# Patient Record
Sex: FEMALE | Race: WHITE | NOT HISPANIC OR LATINO | Employment: FULL TIME | URBAN - METROPOLITAN AREA
[De-identification: names, ages, dates, MRNs, and addresses within clinical notes are randomized per-mention and may not be internally consistent; named-entity substitution may affect disease eponyms.]

---

## 2017-01-21 DIAGNOSIS — K21.9 GERD (GASTROESOPHAGEAL REFLUX DISEASE): Primary | ICD-10-CM

## 2017-09-07 ENCOUNTER — OFFICE VISIT (OUTPATIENT)
Dept: FAMILY MEDICINE | Facility: CLINIC | Age: 47
End: 2017-09-07
Payer: COMMERCIAL

## 2017-09-07 VITALS
DIASTOLIC BLOOD PRESSURE: 94 MMHG | HEART RATE: 90 BPM | HEIGHT: 65 IN | OXYGEN SATURATION: 99 % | TEMPERATURE: 98 F | WEIGHT: 285.2 LBS | BODY MASS INDEX: 47.52 KG/M2 | SYSTOLIC BLOOD PRESSURE: 144 MMHG

## 2017-09-07 DIAGNOSIS — K21.9 GASTROESOPHAGEAL REFLUX DISEASE, ESOPHAGITIS PRESENCE NOT SPECIFIED: ICD-10-CM
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
## 2017-09-07 DIAGNOSIS — S13.9XXA SPRAIN OF NECK, INITIAL ENCOUNTER: Primary | ICD-10-CM

## 2017-09-07 PROCEDURE — 99214 OFFICE O/P EST MOD 30 MIN: CPT | Performed by: INTERNAL MEDICINE

## 2017-09-07 RX ORDER — OXYCODONE AND ACETAMINOPHEN 5; 325 MG/1; MG/1
1-2 TABLET ORAL
Qty: 30 TABLET | Refills: 0 | Status: SHIPPED | OUTPATIENT
Start: 2017-09-07 | End: 2019-04-19

## 2017-09-07 RX ORDER — NAPROXEN 500 MG/1
500 TABLET ORAL 2 TIMES DAILY WITH MEALS
Qty: 60 TABLET | Refills: 1 | Status: SHIPPED | OUTPATIENT
Start: 2017-09-07 | End: 2018-01-06

## 2017-09-07 ASSESSMENT — ENCOUNTER SYMPTOMS
MYALGIAS: 0
HEADACHES: 1
DOUBLE VISION: 0
BLURRED VISION: 0
NAUSEA: 0
HEMATURIA: 0
FEVER: 0
NECK PAIN: 1
LOSS OF CONSCIOUSNESS: 0
CHILLS: 0
SENSORY CHANGE: 0
VOMITING: 0
BACK PAIN: 1
DIZZINESS: 0
SHORTNESS OF BREATH: 0
INSOMNIA: 1
BLOOD IN STOOL: 0
ABDOMINAL PAIN: 0
FOCAL WEAKNESS: 0

## 2017-09-07 NOTE — MR AVS SNAPSHOT
"              After Visit Summary   9/7/2017    Cata Etienne    MRN: [id number]           Patient Information     Date Of Birth          1970        Visit Information        Provider Department      9/7/2017 2:30 PM Eulogio Allen MD [facility]        Today's Diagnoses     Sprain of neck, initial encounter    -  1    Gastroesophageal reflux disease, esophagitis presence not specified          Care Instructions    Proceed with Physical Therapy.    Avoid any lifting, pushing, pulling with your arms or raising your arms above your shoulders.    Call doctor if your neck pain persist/worsens, or if you develop new symptoms or side effects from the medication/s.    Follow up in 2 weeks.            Follow-ups after your visit        Additional Services     PHYSICAL THERAPY REFERRAL       *This therapy referral will be filtered to a centralized scheduling office at [facility] and the patient will receive a call to schedule an appointment at a [facility] location most convenient for them. *     [facility] provides Physical Therapy evaluation and treatment and many specialty services across the [place] system.  If requesting a specialty program, please choose from the list below.    If you have not heard from the scheduling office within 2 business days, please call [phone] for all locations, with the exception of [place], please call [phone].  Treatment: Evaluation & Treatment  Special Instructions/Modalities: as recommended by therapist  Special Programs:       Please be aware that coverage of these services is subject to the terms and limitations of your health insurance plan.  Call member services at your health plan with any benefit or coverage questions.      **Note to Provider:  If you are referring outside of [place] for the therapy appointment, please list the name of the location in the \"special instructions\" above, print the " "referral and give to the patient to schedule the appointment.                  Who to contact     If you have questions or need follow up information about today's clinic visit or your schedule please contact [facility] directly at [phone].  Normal or non-critical lab and imaging results will be communicated to you by shopahart, letter or phone within 4 business days after the clinic has received the results. If you do not hear from us within 7 days, please contact the clinic through shopahart or phone. If you have a critical or abnormal lab result, we will notify you by phone as soon as possible.  Submit refill requests through [employer] or call your pharmacy and they will forward the refill request to us. Please allow 3 business days for your refill to be completed.          Additional Information About Your Visit        [email or web address] Information     [employer] gives you secure access to your electronic health record. If you see a primary care provider, you can also send messages to your care team and make appointments. If you have questions, please call your primary care clinic.  If you do not have a primary care provider, please call [phone] and they will assist you.        Care EveryWhere ID     This is your Care EveryWhere ID. This could be used by other organizations to access your [place] medical records  [id number]        Your Vitals Were     Pulse Temperature Height Last Period Pulse Oximetry Breastfeeding?    90 98  F (36.7  C) (Tympanic) 5' 5\" (1.651 m) 08/07/2017 (Approximate) 99% No    BMI (Body Mass Index)                   47.46 kg/m2            Blood Pressure from Last 3 Encounters:   09/07/17 (!) 144/94   02/10/16 131/80   05/15/13 99/65    Weight from Last 3 Encounters:   09/07/17 285 lb 3.2 oz (129.4 kg)   02/10/16 261 lb 3.2 oz (118.5 kg)   05/15/13 189 lb 6.4 oz (85.9 kg)              We Performed the Following     PHYSICAL THERAPY REFERRAL          Today's Medication Changes        "   These changes are accurate as of: 9/7/17  3:17 PM.  If you have any questions, ask your nurse or doctor.               Start taking these medicines.        Dose/Directions    naproxen 500 MG tablet   Commonly known as:  NAPROSYN   Used for:  Sprain of neck, initial encounter   Started by:  Eulogio Allen MD        Dose:  500 mg   Take 1 tablet (500 mg) by mouth 2 times daily (with meals)   Quantity:  60 tablet   Refills:  1       oxyCODONE-acetaminophen 5-325 MG per tablet   Commonly known as:  PERCOCET   Used for:  Sprain of neck, initial encounter   Started by:  Eulogio Allen MD        Dose:  1-2 tablet   Take 1-2 tablets by mouth nightly as needed for pain maximum 2 tablet(s) per day   Quantity:  30 tablet   Refills:  0         These medicines have changed or have updated prescriptions.        Dose/Directions    omeprazole 20 MG CR capsule   Commonly known as:  priLOSEC   This may have changed:  See the new instructions.   Used for:  Gastroesophageal reflux disease, esophagitis presence not specified   Changed by:  Eulogio Allen MD        Dose:  20 mg   Take 1 capsule (20 mg) by mouth daily   Quantity:  90 capsule   Refills:  0            Where to get your medicines      These medications were sent to [facility] [address] [place] [facility] [place] - [address]  [address] [place] [address]     Phone:  [phone]     naproxen 500 MG tablet    omeprazole 20 MG CR capsule         Some of these will need a paper prescription and others can be bought over the counter.  Ask your nurse if you have questions.     Bring a paper prescription for each of these medications     oxyCODONE-acetaminophen 5-325 MG per tablet                Primary Care Provider Office Phone # Fax #    Eulogio Allen [phone] [address]  [facility] [address]        Equal Access to Services     RUPESH OROZCO AH: Afshan Blackwell  wakishansofia landry, qaybta kavernell arndt, leena corralesaamarlon ah. So [facility] [phone].    ATENCIÓN: Si david em, tiene a carlisle disposición servicios gratuitos de asistencia lingüística. Lee al [phone].    We comply with applicable federal civil rights laws and [place] laws. We do not discriminate on the basis of race, color, national origin, age, disability sex, sexual orientation or gender identity.            Thank you!     Thank you for choosing [facility]  for your care. Our goal is always to provide you with excellent care. Hearing back from our patients is one way we can continue to improve our services. Please take a few minutes to complete the written survey that you may receive in the mail after your visit with us. Thank you!             Your Updated Medication List - Protect others around you: Learn how to safely use, store and throw away your medicines at www.disposemymeds.org.          This list is accurate as of: 9/7/17  3:17 PM.  Always use your most recent med list.                   Brand Name Dispense Instructions for use Diagnosis    naproxen 500 MG tablet    NAPROSYN    60 tablet    Take 1 tablet (500 mg) by mouth 2 times daily (with meals)    Sprain of neck, initial encounter       omeprazole 20 MG CR capsule    priLOSEC    90 capsule    Take 1 capsule (20 mg) by mouth daily    Gastroesophageal reflux disease, esophagitis presence not specified       oxyCODONE-acetaminophen 5-325 MG per tablet    PERCOCET    30 tablet    Take 1-2 tablets by mouth nightly as needed for pain maximum 2 tablet(s) per day    Sprain of neck, initial encounter
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
## 2017-09-07 NOTE — PATIENT INSTRUCTIONS
Proceed with Physical Therapy.    Avoid any lifting, pushing, pulling with your arms or raising your arms above your shoulders.    Call doctor if your neck pain persist/worsens, or if you develop new symptoms or side effects from the medication/s.    Follow up in 2 weeks.

## 2017-10-05 ENCOUNTER — OFFICE VISIT (OUTPATIENT)
Dept: FAMILY MEDICINE | Facility: CLINIC | Age: 47
End: 2017-10-05
Payer: COMMERCIAL

## 2017-10-05 VITALS
SYSTOLIC BLOOD PRESSURE: 154 MMHG | OXYGEN SATURATION: 97 % | DIASTOLIC BLOOD PRESSURE: 93 MMHG | WEIGHT: 285 LBS | TEMPERATURE: 97.9 F | BODY MASS INDEX: 47.48 KG/M2 | HEART RATE: 83 BPM | HEIGHT: 65 IN

## 2017-10-05 DIAGNOSIS — Z23 NEED FOR PROPHYLACTIC VACCINATION AND INOCULATION AGAINST INFLUENZA: ICD-10-CM

## 2017-10-05 DIAGNOSIS — F40.240 CLAUSTROPHOBIA: ICD-10-CM

## 2017-10-05 DIAGNOSIS — M54.2 NECK PAIN: ICD-10-CM

## 2017-10-05 DIAGNOSIS — G89.29 CHRONIC RIGHT SHOULDER PAIN: ICD-10-CM

## 2017-10-05 DIAGNOSIS — R51.9 NONINTRACTABLE HEADACHE, UNSPECIFIED CHRONICITY PATTERN, UNSPECIFIED HEADACHE TYPE: ICD-10-CM

## 2017-10-05 DIAGNOSIS — K21.9 GASTROESOPHAGEAL REFLUX DISEASE, ESOPHAGITIS PRESENCE NOT SPECIFIED: ICD-10-CM

## 2017-10-05 DIAGNOSIS — M25.511 CHRONIC RIGHT SHOULDER PAIN: ICD-10-CM
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
## 2017-10-05 PROCEDURE — 99214 OFFICE O/P EST MOD 30 MIN: CPT | Mod: 25 | Performed by: INTERNAL MEDICINE

## 2017-10-05 PROCEDURE — 90686 IIV4 VACC NO PRSV 0.5 ML IM: CPT | Performed by: INTERNAL MEDICINE

## 2017-10-05 PROCEDURE — 90471 IMMUNIZATION ADMIN: CPT | Performed by: INTERNAL MEDICINE

## 2017-10-05 RX ORDER — CYCLOBENZAPRINE HCL 10 MG
10 TABLET ORAL
Qty: 30 TABLET | Refills: 1 | Status: SHIPPED | OUTPATIENT
Start: 2017-10-05 | End: 2019-04-19

## 2017-10-05 RX ORDER — IBUPROFEN 200 MG
200 TABLET ORAL EVERY 4 HOURS PRN
COMMUNITY
End: 2022-03-11

## 2017-10-05 RX ORDER — DIAZEPAM 5 MG
TABLET ORAL
Qty: 3 TABLET | Refills: 0 | Status: SHIPPED | OUTPATIENT
Start: 2017-10-05 | End: 2019-04-19

## 2017-10-05 ASSESSMENT — ENCOUNTER SYMPTOMS
DOUBLE VISION: 0
NAUSEA: 0
NECK PAIN: 1
DIZZINESS: 0
HEARTBURN: 0
TINGLING: 0
ABDOMINAL PAIN: 0
SENSORY CHANGE: 0
LOSS OF CONSCIOUSNESS: 0
BLURRED VISION: 0
FOCAL WEAKNESS: 0
VOMITING: 0
EYE PAIN: 0
HEADACHES: 1
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
## 2017-10-05 NOTE — PATIENT INSTRUCTIONS
Proceed with MRI.    Call doctor if your pain persist/worsens, or if you develop new symptoms or side effects from the medication.

## 2018-01-06 DIAGNOSIS — S13.9XXA SPRAIN OF NECK, INITIAL ENCOUNTER: ICD-10-CM
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
## 2018-01-06 NOTE — TELEPHONE ENCOUNTER
Requested Prescriptions   Pending Prescriptions Disp Refills     naproxen (NAPROSYN) 500 MG tablet 60 tablet 1    Last Written Prescription Date:  9/07/17  Last Fill Quantity: 60 tablet,  # refills: 1   Last Office Visit with FMYUNG, LARISA or [facility] prescribing provider:  10/05/17 Tiffany   Future Office Visit:      Sig: Take 1 tablet (500 mg) by mouth 2 times daily (with meals)    NSAID Medications Failed    1/6/2018  9:13 AM       Failed - Blood pressure under 140/90    BP Readings from Last 3 Encounters:   10/05/17 (!) 154/93   09/07/17 (!) 144/94   02/10/16 131/80                Failed - Normal ALT on file in past 12 months    Recent Labs   Lab Test  02/10/16   1452   ALT  21            Failed - Normal AST on file in past 12 months    Recent Labs   Lab Test  02/10/16   1452   AST  15            Failed - Normal CBC on file in past 12 months    Recent Labs   Lab Test  02/10/16   1452   WBC  10.2   RBC  4.60   HGB  13.2   HCT  40.4   PLT  360            Failed - Normal serum creatinine on file in past 12 months    Recent Labs   Lab Test  02/10/16   1452   CR  0.71            Passed - Recent or future visit with authorizing provider's specialty    Patient had office visit in the last year or has a visit in the next 30 days with authorizing provider.  See chart review.              Passed - Patient is age 6-64 years       Passed - No active pregnancy on record       Passed - No positive pregnancy test in past 12 months

## 2018-01-09 RX ORDER — NAPROXEN 500 MG/1
500 TABLET ORAL 2 TIMES DAILY WITH MEALS
Qty: 40 TABLET | Refills: 0 | Status: SHIPPED | OUTPATIENT
Start: 2018-01-09 | End: 2019-04-19

## 2018-01-09 NOTE — TELEPHONE ENCOUNTER
"Routing refill request to provider for review/approval because:  Labs not current:  ALT, CBC, AST, CR    Please review and authorize if appropriate,     Thank you,   Alexia RODNEY RN         Requested Prescriptions   Pending Prescriptions Disp Refills     naproxen (NAPROSYN) 500 MG tablet 60 tablet 1     Sig: Take 1 tablet (500 mg) by mouth 2 times daily (with meals)    NSAID Medications Failed    1/6/2018  9:14 AM       Failed - Blood pressure under 140/90    BP Readings from Last 3 Encounters:   10/05/17 (!) 154/93   09/07/17 (!) 144/94   02/10/16 131/80                Failed - Normal ALT on file in past 12 months    Recent Labs   Lab Test  02/10/16   1452   ALT  21            Failed - Normal AST on file in past 12 months    Recent Labs   Lab Test  02/10/16   1452   AST  15            Failed - Normal CBC on file in past 12 months    Recent Labs   Lab Test  02/10/16   1452   WBC  10.2   RBC  4.60   HGB  13.2   HCT  40.4   PLT  360            Failed - Normal serum creatinine on file in past 12 months    Recent Labs   Lab Test  02/10/16   1452   CR  0.71            Passed - Recent or future visit with authorizing provider's specialty    Patient had office visit in the last year or has a visit in the next 30 days with authorizing provider.  See \"Patient Info\" tab in inbasket, or \"Choose Columns\" in Meds & Orders section of the refill encounter.              Passed - Patient is age 6-64 years       Passed - No active pregnancy on record       Passed - No positive pregnancy test in past 12 months          "

## 2018-01-09 NOTE — TELEPHONE ENCOUNTER
Called and left message for patient to call back to inform of below message from PCP  Also sent Wipithart message informed her    Michela TOBIAS RN

## 2018-09-01 ENCOUNTER — HEALTH MAINTENANCE LETTER (OUTPATIENT)
Age: 48
End: 2018-09-01

## 2018-11-26 DIAGNOSIS — K21.9 GASTROESOPHAGEAL REFLUX DISEASE, ESOPHAGITIS PRESENCE NOT SPECIFIED: ICD-10-CM

## 2018-11-26 NOTE — TELEPHONE ENCOUNTER
"omeprazole (PRILOSEC) 20 MG CR capsule  Last Written Prescription Date:  10/5/17  Last Fill Quantity: 90,  # refills: 3   Last office visit: 10/5/2017 with prescribing provider:  Tiffany   Future Office Visit:        Requested Prescriptions   Pending Prescriptions Disp Refills     omeprazole (PRILOSEC) 20 MG CR capsule 90 capsule 3     Sig: Take 1 capsule (20 mg) by mouth daily    PPI Protocol Failed    11/26/2018  9:36 AM       Failed - Recent (12 mo) or future (30 days) visit within the authorizing provider's specialty    Patient had office visit in the last 12 months or has a visit in the next 30 days with authorizing provider or within the authorizing provider's specialty.  See \"Patient Info\" tab in inbasket, or \"Choose Columns\" in Meds & Orders section of the refill encounter.             Passed - Not on Clopidogrel (unless Pantoprazole ordered)       Passed - No diagnosis of osteoporosis on record       Passed - Patient is age 18 or older       Passed - No active pregnacy on record       Passed - No positive pregnancy test in past 12 months          "

## 2018-11-28 NOTE — TELEPHONE ENCOUNTER
Pt is due for annual OV. Refilled #30 with note to pharmacy to inform patient to schedule an appointment.    Joaquim PARKS RN

## 2019-01-10 ENCOUNTER — TELEPHONE (OUTPATIENT)
Dept: FAMILY MEDICINE | Facility: CLINIC | Age: 49
End: 2019-01-10

## 2019-01-11 NOTE — TELEPHONE ENCOUNTER
1/10/2019  Call Regarding Preventive Health Screening VIP Mammogram     Attempt 1     Message on voicemail   ?  ?  Outreach   jose

## 2019-03-20 NOTE — TELEPHONE ENCOUNTER
3/20/2019    Attempt 2    Contacted patient in regards to scheduling VIP mammogram, SH for April 18.    Message on voicemail     Patient is also due for - Preventive Health Screening Colonoscopy, Cervical/PAP and LDL    Comments:       Outreach   CC

## 2019-04-19 ENCOUNTER — OFFICE VISIT (OUTPATIENT)
Dept: FAMILY MEDICINE | Facility: CLINIC | Age: 49
End: 2019-04-19
Payer: COMMERCIAL

## 2019-04-19 VITALS
OXYGEN SATURATION: 97 % | BODY MASS INDEX: 48.82 KG/M2 | TEMPERATURE: 97.3 F | SYSTOLIC BLOOD PRESSURE: 141 MMHG | DIASTOLIC BLOOD PRESSURE: 89 MMHG | HEIGHT: 65 IN | WEIGHT: 293 LBS | HEART RATE: 87 BPM

## 2019-04-19 DIAGNOSIS — J06.9 VIRAL URI WITH COUGH: Primary | ICD-10-CM

## 2019-04-19 DIAGNOSIS — K21.9 GASTROESOPHAGEAL REFLUX DISEASE, ESOPHAGITIS PRESENCE NOT SPECIFIED: ICD-10-CM

## 2019-04-19 PROCEDURE — 99213 OFFICE O/P EST LOW 20 MIN: CPT | Performed by: NURSE PRACTITIONER

## 2019-04-19 ASSESSMENT — MIFFLIN-ST. JEOR: SCORE: 1972.6

## 2019-04-19 NOTE — PROGRESS NOTES
"HPI    SUBJECTIVE:   Cata Etienne is a 48 year old female who presents to clinic today for the following   health issues:      RESPIRATORY SYMPTOMS      Duration: Since 04/04/2019    Description  facial pain/pressure, cough, ear pain bilateral, headache, hoarse voice and conjunctival irritation    Severity: moderate    Accompanying signs and symptoms: None    History (predisposing factors):  none    Precipitating or alleviating factors: None    Therapies tried and outcome:  acetaminophen    Overall is feeling better since cold started a couple weeks ago  Has HA  L eye is itchy and dry   Has a ton of stress with dad just passed and mom has been in the hospital 3 times in the last 2 months   Never has had seasonal allergies     Past Medical History:   Diagnosis Date     HTN (hypertension)      Obesity      Family History   Problem Relation Age of Onset     Breast Cancer Mother      Diabetes Mother      Obesity Mother      Hypertension Father      Cancer Paternal Grandfather         leukemia     No past surgical history on file.  Social History     Tobacco Use     Smoking status: Never Smoker     Smokeless tobacco: Never Used   Substance Use Topics     Alcohol use: Yes     Alcohol/week: 0.0 oz     Comment: occasional     Current Outpatient Medications   Medication Sig Dispense Refill     ibuprofen (ADVIL/MOTRIN) 200 MG tablet Take 200 mg by mouth every 4 hours as needed for mild pain       omeprazole (PRILOSEC) 20 MG DR capsule Take 1 capsule (20 mg) by mouth daily 90 capsule 1     Allergies   Allergen Reactions     No Known Allergies        Reviewed and updated as needed this visit by clinical staff and provider     ROS  Detailed as above       /89 (BP Location: Right arm, Patient Position: Sitting, Cuff Size: Adult Large)   Pulse 87   Temp 97.3  F (36.3  C) (Oral)   Ht 1.651 m (5' 5\")   Wt 135.2 kg (298 lb)   SpO2 97%   BMI 49.59 kg/m     Physical Exam   Constitutional: She is oriented to person, place, " and time. She appears well-developed.   HENT:   Head: Normocephalic.   Right Ear: Tympanic membrane, external ear and ear canal normal.   Left Ear: Tympanic membrane, external ear and ear canal normal.   Mouth/Throat: Oropharynx is clear and moist. No oropharyngeal exudate.   L nasal mucosal edema with irritation   Eyes: Conjunctivae are normal.   Neck: Normal range of motion.   Cardiovascular: Normal rate, regular rhythm and normal heart sounds.   No murmur heard.  Pulmonary/Chest: Effort normal and breath sounds normal. No respiratory distress.   Lymphadenopathy:     She has no cervical adenopathy.   Neurological: She is alert and oriented to person, place, and time.   Skin: Skin is warm and dry.   Psychiatric: She has a normal mood and affect. Judgment normal.       Assessment and Plan:       ICD-10-CM    1. Viral URI with cough J06.9     B97.89    2. Gastroesophageal reflux disease, esophagitis presence not specified K21.9 omeprazole (PRILOSEC) 20 MG DR capsule     Suspect viral etiology as she has improved a lot since onset of symptoms. Will continue to watch and wait. Push fluids. Continue OTC Symptomatic treatments.   Refilled omeprazole      Tiffany Lindsay APRN, CNP  [facility]

## 2019-04-26 ENCOUNTER — TELEPHONE (OUTPATIENT)
Dept: FAMILY MEDICINE | Facility: CLINIC | Age: 49
End: 2019-04-26

## 2019-04-26 NOTE — TELEPHONE ENCOUNTER
Called work number, no answer. LVM asking patient to call triage back     Also tried home phone, did not leave another message    Michela TOBIAS RN

## 2019-04-26 NOTE — TELEPHONE ENCOUNTER
Reason for call:  Patient reporting a symptom    Symptom or request: continuation of symptoms - cough, green nasal discharge    Duration (how long have symptoms been present): 3 weeks    Have you been treated for this before? Yes OV 4.19.19    Additional comments: Pt was told to contact if her symptoms did not subside with nasal spray and eye drops    Phone Number patient can be reached at:  Work number on file:  [phone] (work)    Best Time:  any    Can we leave a detailed message on this number:  YES    Call taken on 4/26/2019 at 8:09 AM by Any Eduardo

## 2019-04-26 NOTE — TELEPHONE ENCOUNTER
Called and left detailed message with advise from Tiffany on patient's cell phone (as advised by patient in earlier call).      Graciela SCHERER RN,BSN

## 2019-04-26 NOTE — TELEPHONE ENCOUNTER
When I saw her she was improving. If she is continuing to improve but very slowly, then I would recommend we continue to wait. Its most important to go in the right direction. I suspect she has another week of symptoms as most people are having symptoms for 4 weeks at least.  Green sputum is normal and just a sign of her body working appropriately.   Can try Afrin at night for just 3 days to help with nasal symptoms if she hasn't done that nasal spray.   Thanks   ZHANNA Antunez CNP

## 2019-08-16 ENCOUNTER — APPOINTMENT (OUTPATIENT)
Dept: CT IMAGING | Facility: CLINIC | Age: 49
End: 2019-08-16
Attending: PHYSICIAN ASSISTANT
Payer: COMMERCIAL

## 2019-08-16 ENCOUNTER — TELEPHONE (OUTPATIENT)
Dept: FAMILY MEDICINE | Facility: CLINIC | Age: 49
End: 2019-08-16

## 2019-08-16 ENCOUNTER — HOSPITAL ENCOUNTER (EMERGENCY)
Facility: CLINIC | Age: 49
Discharge: HOME | End: 2019-08-16
Admitting: EMERGENCY MEDICINE
Payer: COMMERCIAL

## 2019-08-16 VITALS
HEART RATE: 84 BPM | WEIGHT: 270 LBS | OXYGEN SATURATION: 96 % | TEMPERATURE: 98.2 F | BODY MASS INDEX: 44.98 KG/M2 | SYSTOLIC BLOOD PRESSURE: 139 MMHG | DIASTOLIC BLOOD PRESSURE: 82 MMHG | RESPIRATION RATE: 17 BRPM | HEIGHT: 65 IN

## 2019-08-16 DIAGNOSIS — R91.8 PULMONARY NODULES: ICD-10-CM

## 2019-08-16 DIAGNOSIS — F41.9 ANXIETY: ICD-10-CM

## 2019-08-16 DIAGNOSIS — R06.02 SHORTNESS OF BREATH: ICD-10-CM

## 2019-08-16 DIAGNOSIS — R07.89 ATYPICAL CHEST PAIN: ICD-10-CM

## 2019-08-16 LAB
ALBUMIN SERPL-MCNC: 3.5 G/DL (ref 3.4–5)
ALP SERPL-CCNC: 152 U/L (ref 40–150)
ALT SERPL W P-5'-P-CCNC: 21 U/L (ref 0–50)
ANION GAP SERPL CALCULATED.3IONS-SCNC: 8 MMOL/L (ref 3–14)
AST SERPL W P-5'-P-CCNC: 19 U/L (ref 0–45)
BASOPHILS # BLD AUTO: 0 10E9/L (ref 0–0.2)
BASOPHILS NFR BLD AUTO: 0.2 %
BILIRUB SERPL-MCNC: 0.4 MG/DL (ref 0.2–1.3)
BUN SERPL-MCNC: 15 MG/DL (ref 7–30)
CALCIUM SERPL-MCNC: 9.3 MG/DL (ref 8.5–10.1)
CHLORIDE SERPL-SCNC: 107 MMOL/L (ref 94–109)
CO2 SERPL-SCNC: 25 MMOL/L (ref 20–32)
CREAT SERPL-MCNC: 0.67 MG/DL (ref 0.52–1.04)
D DIMER PPP FEU-MCNC: 0.7 UG/ML FEU (ref 0–0.5)
DIFFERENTIAL METHOD BLD: ABNORMAL
DIFFERENTIAL METHOD BLD: NORMAL
EOSINOPHIL # BLD AUTO: 0.1 10E9/L (ref 0–0.7)
EOSINOPHIL NFR BLD AUTO: 1 %
ERYTHROCYTE [DISTWIDTH] IN BLOOD BY AUTOMATED COUNT: 13.6 % (ref 10–15)
ERYTHROCYTE [DISTWIDTH] IN BLOOD BY AUTOMATED COUNT: NORMAL % (ref 10–15)
GFR SERPL CREATININE-BSD FRML MDRD: >90 ML/MIN/{1.73_M2}
GLUCOSE SERPL-MCNC: 87 MG/DL (ref 70–99)
HCT VFR BLD AUTO: 41.1 % (ref 35–47)
HCT VFR BLD AUTO: NORMAL % (ref 35–47)
HGB BLD-MCNC: 13.6 G/DL (ref 11.7–15.7)
HGB BLD-MCNC: NORMAL G/DL (ref 11.7–15.7)
IMM GRANULOCYTES # BLD: 0 10E9/L (ref 0–0.4)
IMM GRANULOCYTES NFR BLD: 0.4 %
LYMPHOCYTES # BLD AUTO: 2.6 10E9/L (ref 0.8–5.3)
LYMPHOCYTES NFR BLD AUTO: 23 %
MCH RBC QN AUTO: 28.7 PG (ref 26.5–33)
MCH RBC QN AUTO: NORMAL PG (ref 26.5–33)
MCHC RBC AUTO-ENTMCNC: 33.1 G/DL (ref 31.5–36.5)
MCHC RBC AUTO-ENTMCNC: NORMAL G/DL (ref 31.5–36.5)
MCV RBC AUTO: 87 FL (ref 78–100)
MCV RBC AUTO: NORMAL FL (ref 78–100)
MONOCYTES # BLD AUTO: 0.6 10E9/L (ref 0–1.3)
MONOCYTES NFR BLD AUTO: 5.5 %
NEUTROPHILS # BLD AUTO: 7.8 10E9/L (ref 1.6–8.3)
NEUTROPHILS NFR BLD AUTO: 69.9 %
NRBC # BLD AUTO: 0 10*3/UL
NRBC BLD AUTO-RTO: 0 /100
PLATELET # BLD AUTO: 316 10E9/L (ref 150–450)
PLATELET # BLD AUTO: NORMAL 10E9/L (ref 150–450)
POTASSIUM SERPL-SCNC: 4.3 MMOL/L (ref 3.4–5.3)
PROT SERPL-MCNC: 7.7 G/DL (ref 6.8–8.8)
RBC # BLD AUTO: 4.74 10E12/L (ref 3.8–5.2)
RBC # BLD AUTO: NORMAL 10E12/L (ref 3.8–5.2)
SODIUM SERPL-SCNC: 140 MMOL/L (ref 133–144)
TROPONIN I SERPL-MCNC: <0.015 UG/L (ref 0–0.04)
WBC # BLD AUTO: 11.1 10E9/L (ref 4–11)
WBC # BLD AUTO: NORMAL 10E9/L (ref 4–11)

## 2019-08-16 PROCEDURE — 85025 COMPLETE CBC W/AUTO DIFF WBC: CPT | Performed by: PHYSICIAN ASSISTANT

## 2019-08-16 PROCEDURE — 25000128 H RX IP 250 OP 636: Performed by: PHYSICIAN ASSISTANT

## 2019-08-16 PROCEDURE — 80053 COMPREHEN METABOLIC PANEL: CPT | Performed by: PHYSICIAN ASSISTANT

## 2019-08-16 PROCEDURE — 85379 FIBRIN DEGRADATION QUANT: CPT | Performed by: PHYSICIAN ASSISTANT

## 2019-08-16 PROCEDURE — 93005 ELECTROCARDIOGRAM TRACING: CPT

## 2019-08-16 PROCEDURE — 99285 EMERGENCY DEPT VISIT HI MDM: CPT | Mod: 25

## 2019-08-16 PROCEDURE — 25000125 ZZHC RX 250: Performed by: PHYSICIAN ASSISTANT

## 2019-08-16 PROCEDURE — 96374 THER/PROPH/DIAG INJ IV PUSH: CPT | Mod: 59

## 2019-08-16 PROCEDURE — 84484 ASSAY OF TROPONIN QUANT: CPT | Performed by: PHYSICIAN ASSISTANT

## 2019-08-16 PROCEDURE — 71275 CT ANGIOGRAPHY CHEST: CPT

## 2019-08-16 RX ORDER — LORAZEPAM 2 MG/ML
1 INJECTION INTRAMUSCULAR ONCE
Status: COMPLETED | OUTPATIENT
Start: 2019-08-16 | End: 2019-08-16

## 2019-08-16 RX ORDER — IOPAMIDOL 755 MG/ML
82 INJECTION, SOLUTION INTRAVASCULAR ONCE
Status: COMPLETED | OUTPATIENT
Start: 2019-08-16 | End: 2019-08-16

## 2019-08-16 RX ADMIN — LORAZEPAM 1 MG: 2 INJECTION INTRAMUSCULAR; INTRAVENOUS at 18:52

## 2019-08-16 RX ADMIN — SODIUM CHLORIDE 100 ML: 9 INJECTION, SOLUTION INTRAVENOUS at 18:57

## 2019-08-16 RX ADMIN — IOPAMIDOL 82 ML: 755 INJECTION, SOLUTION INTRAVENOUS at 19:06

## 2019-08-16 ASSESSMENT — ENCOUNTER SYMPTOMS
HEADACHES: 1
FEVER: 0
SPEECH DIFFICULTY: 0
CHILLS: 0
COUGH: 0
NERVOUS/ANXIOUS: 1
SHORTNESS OF BREATH: 0
CHEST TIGHTNESS: 1

## 2019-08-16 ASSESSMENT — MIFFLIN-ST. JEOR: SCORE: 1840.59

## 2019-08-16 NOTE — TELEPHONE ENCOUNTER
"Spoke with patient.     Symptoms:  SOB, chest tightness, HA across forehead and back of neck (RATES 4-5/10).  Slight dizziness.     Denies wheezing, cough, recent URI.    Denies hx of asthma.    Denies chest pain with inspiration.    Denies visual changes.      Onset:  1-2 days ago.  \"much worse today\".   Calling from work.      Patient reports a lot of stress in her life recently and thinks today's symptoms \"might be related to the stress and anxiety\".      Patient has taken OTC Tylenol today for the HA---without any relief.      No appts available in clinic today .  PCP not in clinic for work-in appointment.    Advised URGENT CARE or ED---depending on severity of symptoms.      Patient stated understanding and agreement with advise/plan.    Plans to go to Urgent Care first, then ED if recommended by Urgent Care.     Graciela SCHERER RN,BSN    "

## 2019-08-16 NOTE — ED PROVIDER NOTES
"  History     Chief Complaint:  Chest Pain    The history is provided by the patient.      Cata Etienne is a 49 year old female who presents with concerns for ten days intermittent chest tightness. She highlights she has been feeling \"a little bit\" of chest tightness when she gets up to use the bathroom at night that requires her to catch her breath. Patient states it is likely her discomforts have been secondary to her elevated stress over the past eight months due to significant family and work stressors. In addition, she comments on one day of headache today that she details is improved since taking Tylenol earlier today. Patient also acknowledges a more sedentary lifestyle recently. She denies any fever, chest pain, cough, chills, vision/gait/speech changes, or history of blood clots, COPD, and asthma.     Patient's cardiac risk factors include:     CAD/CVA/TIA/PAD: Negative  Hypertension:   She reports history of hypertension (~8 years ago)   Hyperlipidemia:  Negative  Diabetes:   Negative  Obesity (BMI>30): Positive  Hormone use:  Negative  Hx tobacco use:  Negative  Male Sex:   Negative  Age >45:  Positive  Familial Hx of CAD:  Negative    Allergies:  No Known Drug Allergies    Medications:    Prilosec    Past Medical History:    Hypertension  GERD  Obesity    Past Surgical History:    The patient does not have any pertinent past surgical history.    Family History:    Breast cancer  DM  Obesity    Social History:  Accompanied by .  Never Smoker  Alcohol Use: Positive     Review of Systems   Constitutional: Negative for chills and fever.   Eyes: Negative for visual disturbance.   Respiratory: Positive for chest tightness. Negative for cough and shortness of breath.    Cardiovascular: Negative for chest pain.   Musculoskeletal: Negative for gait problem.   Neurological: Positive for headaches. Negative for speech difficulty.   Psychiatric/Behavioral: The patient is nervous/anxious.    All other systems " "reviewed and are negative.    Physical Exam     Patient Vitals for the past 24 hrs:   BP Temp Temp src Pulse Heart Rate Resp SpO2 Height Weight   08/16/19 1758 139/82 -- -- -- 94 17 -- -- --   08/16/19 1630 -- -- -- 84 85 17 96 % -- --   08/16/19 1603 -- -- -- -- 87 17 -- -- --   08/16/19 1602 -- -- -- -- 90 24 -- -- --   08/16/19 1601 -- -- -- -- 91 24 -- -- --   08/16/19 1559 -- -- -- -- 93 (!) 32 -- -- --   08/16/19 1558 -- -- -- -- 92 (!) 44 -- -- --   08/16/19 1557 -- -- -- -- 91 26 -- -- --   08/16/19 1556 -- -- -- -- 89 (!) 57 -- -- --   08/16/19 1555 -- -- -- -- 95 20 -- -- --   08/16/19 1554 -- -- -- -- 96 14 98 % -- --   08/16/19 1553 -- -- -- -- 96 16 98 % -- --   08/16/19 1551 -- -- -- -- -- 30 98 % -- --   08/16/19 1550 -- -- -- -- -- -- 98 % -- --   08/16/19 1549 -- -- -- -- -- -- 98 % -- --   08/16/19 1548 -- -- -- -- -- -- 96 % -- --   08/16/19 1547 (!) 143/88 -- -- 96 -- -- -- -- --   08/16/19 1449 (!) 172/96 98.2  F (36.8  C) Oral 94 -- 14 100 % 1.651 m (5' 5\") 122.5 kg (270 lb)     Physical Exam  General: Well appearing, well nourished. Obese habitus. Occasionally tearful.   Skin: Good turgor, no rash, no unusual bruising or prominent lesions.  HEENT: Head: Normocephalic, atraumatic, no visible masses.   Eyes: Conjunctiva clear.  Cardiac: Normal rate and regular rhythm, no murmur or gallop.   Lungs: Clear to auscultation.  Abdomen: Abdomen soft, non-tender. No rebound tenderness of guarding.   Musculoskeletal: Normal gait and station. No calf tenderness or swelling.  No reproducible anterior chest discomfort.  Neurologic: Oriented x 3. GCS: 15.  Psychiatric: Intact recent and remote memory, judgment and insight.  Appears anxious, occasionally tearful.     Emergency Department Course   ECG:  ECG taken at 1454  Normal sinus rhythm  Normal ECG  Rate 99 bpm. VA interval 150 ms. QRS duration 82 ms. QT/QTc 350/449 ms. P-R-T axes 17 1 10.    Imaging:  CT Chest Pulmonary Embolism w Contrast  1. No " pulmonary embolism, acute thoracic aortic abnormality, or acute airspace disease.  2. Small hiatal hernia.  3. Incidental small pulmonary nodule. See below for follow-up imaging guidelines.  Reading per radiology    Laboratory:  CBC: WBC 11.1 (H), HGB 13.6,   CMP: ALKPHOS 152 (H) o/w WNL (Creatinine 0.67)  Troponin (Collected 1618): <0.015  D dimer: 0.7 (H)    Interventions:  1852: Ativan 1 mg IV    Emergency Department Course:  1454 EKG obtained in the ED, see results above.   1551 Nursing notes and vitals reviewed. I performed an exam of the patient as documented above.   1749 Patient updated.   1835 Updated and reassessed.   1846 The patient was sent for a CT while in the emergency department, results above.   1945 Patient updated.   1957 I personally reviewed the imaging and laboratory results with the patient and answered all related questions prior to discharge, anticipatory guidance given.    Impression & Plan      Medical Decision Making:  Cata Etienne is a 49 year old female who presents to the emergency department today for evaluation of shortness of breath and chest tightness who presented to the ED today for evaluation of chest pain. Details of the patient's history can be seen in the HPI. Differential diagnosis included ACS, dissection, pneumothorax, pneumonia, PE, costochondritis, pericarditis, panic attack, gastric reflux, amongst others. Workup in the ED yielded no acute abnormalities with CBC or BMP. Initial troponin returned negative.  She has been having her symptoms for many days, I do not feel that this need to be repeated.  ECG did not show any evidence of STEMI. CXR was clear. The patient's heart score was found to be 2, low risk.  The patient was low risk on Wells criteria, but not PERC negative due to her age, d-dimer obtained, this returned elevated.  She did undergo a CT PE study, that returned showing evidence of pulmonary nodule, small hiatal hernia, but no other acute  abnormalities.  The patient was incredibly tearful throughout her presentation here in the ED.  She did note multiple life stressors including her father's death and her mother's illness within the last few months.  I do suspect that she may have had a mild panic attack earlier today and she is suffering from anxiety.  I advised her to follow-up with her primary early next week to have further discussion of this, and potentially set up therapy.  She is in agreement with this plan.  She will otherwise return the emergency department for worsening chest pain, shortness of breath, nausea, diaphoresis, new concerns.  All questions were answered prior to discharge.  She is in agreement with the treatment plan.    Diagnosis:    ICD-10-CM   1. Shortness of breath R06.02   2. Anxiety F41.9   3. Atypical chest pain R07.89   4. Pulmonary nodules R91.8     Disposition: Home    Scribe Disclosure:  I, Joby Sadler, am serving as a scribe at 3:53 PM on 8/16/2019 to document services personally performed by Jess Bernardo PA-C based on my observations and the provider's statements to me.     EMERGENCY DEPARTMENT    This was created at least in part with a voice recognition software. Mistakes/typos may be present.        Jess Bernardo PA  08/16/19 3753

## 2019-08-16 NOTE — ED TRIAGE NOTES
Patient reports shortness of breath and chest pain on/off for a week. Today she developed a headache and she was unable to sleep last night.

## 2019-08-16 NOTE — TELEPHONE ENCOUNTER
Reason for call:  Patient reporting a symptom    Symptom or request: Shortness of breath, headaches,     Duration (how long have symptoms been present): 1 week     Have you been treated for this before? No    Additional comments:     Phone Number patient can be reached at:  Cell number on file:    Telephone Information:   Mobile [phone]       Best Time:  any    Can we leave a detailed message on this number:  YES    Call taken on 8/16/2019 at 12:25 PM by Aylin Catalan

## 2019-08-16 NOTE — ED AVS SNAPSHOT
Emergency Department  [address] [place] [id number]  Phone:  [phone]  Fax:  [phone]                                    Cata Etienne   MRN: [id number]    Department:   Emergency Department   Date of Visit:  8/16/2019           After Visit Summary Signature Page    I have received my discharge instructions, and my questions have been answered. I have discussed any challenges I see with this plan with the nurse or doctor.    ..........................................................................................................................................  Patient/Patient Representative Signature      ..........................................................................................................................................  Patient Representative Print Name and Relationship to Patient    ..................................................               ................................................  Date                                   Time    ..........................................................................................................................................  Reviewed by Signature/Title    ...................................................              ..............................................  Date                                               Time          22EPIC Rev 08/18

## 2019-08-17 NOTE — DISCHARGE INSTRUCTIONS
See your primary next week for recheck and to discuss possible therapy.  Also have your blood pressure recheck.  Return to the emergency department for changing worsening of chest pressure/pain, increasing shortness of breath, fevers, nausea, new concerns.    Discharge Instructions  Chest Pain    You have been seen today for chest pain or discomfort.  At this time, your doctor has found no signs that your chest pain is due to a serious or life-threatening condition, (or you have declined more testing and/or admission to the hospital). However, sometimes there is a serious problem that does not show up right away. Your evaluation today may not be complete and you may need further testing and evaluation.     You need to follow-up with your regular doctor within 3 days.    Return to the Emergency Department if:  Your chest pain changes, gets worse, starts to happen more often, or comes with less activity.  You are short of breath.  You get very weak or tired.  You pass out or faint.  You have any new symptoms, like fever, cough, numb legs, or you cough up blood.  You have anything else that worries you.    Until you follow-up with your regular doctor please do the following:  Take one aspirin daily unless you have an allergy or are told not to by your doctor.  If a stress test appointment has been made, go to the appointment.  If you have questions, contact your regular doctor.    If your doctor today has told you to follow-up with your regular doctor, it is very important that you make an appointment with your clinic and go to the appointment.  If you do not follow-up with your primary doctor, it may result in missing an important development which could result in permanent injury or disability and/or lasting pain.  If there is any problem keeping your appointment, call your doctor or return to the Emergency Department.    If you were given a prescription for medicine here today, be sure to read all of the information  (including the package insert) that comes with your prescription.  This will include important information about the medicine, its side effects, and any warnings that you need to know about.  The pharmacist who fills the prescription can provide more information and answer questions you may have about the medicine.  If you have questions or concerns that the pharmacist cannot address, please call or return to the Emergency Department.     Opioid Medication Information    Pain medications are among the most commonly prescribed medicines, so we are including this information for all our patients. If you did not receive pain medication or get a prescription for pain medicine, you can ignore it.     You may have been given a prescription for an opioid (narcotic) pain medicine and/or have received a pain medicine while here in the Emergency Department. These medicines can make you drowsy or impaired. You must not drive, operate dangerous equipment, or engage in any other dangerous activities while taking these medications. If you drive while taking these medications, you could be arrested for DUI, or driving under the influence. Do not drink any alcohol while you are taking these medications.     Opioid pain medications can cause addiction. If you have a history of chemical dependency of any type, you are at a higher risk of becoming addicted to pain medications.  Only take these prescribed medications to treat your pain when all other options have been tried. Take it for as short a time and as few doses as possible. Store your pain pills in a secure place, as they are frequently stolen and provide a dangerous opportunity for children or visitors in your house to start abusing these powerful medications. We will not replace any lost or stolen medicine.  As soon as your pain is better, you should flush all your remaining medication.     Many prescription pain medications contain Tylenol  (acetaminophen), including  Vicodin , Tylenol #3 , Norco , Lortab , and Percocet .  You should not take any extra pills of Tylenol  if you are using these prescription medications or you can get very sick.  Do not ever take more than 3000 mg of acetaminophen in any 24 hour period.    All opioids tend to cause constipation. Drink plenty of water and eat foods that have a lot of fiber, such as fruits, vegetables, prune juice, apple juice and high fiber cereal.  Take a laxative if you don t move your bowels at least every other day. Miralax , Milk of Magnesia, Colace , or Senna  can be used to keep you regular.      Remember that you can always come back to the Emergency Department if you are not able to see your regular doctor in the amount of time listed above, if you get any new symptoms, or if there is anything that worries you.

## 2019-08-20 ENCOUNTER — OFFICE VISIT (OUTPATIENT)
Dept: FAMILY MEDICINE | Facility: CLINIC | Age: 49
End: 2019-08-20
Payer: COMMERCIAL

## 2019-08-20 VITALS
TEMPERATURE: 99.4 F | OXYGEN SATURATION: 97 % | DIASTOLIC BLOOD PRESSURE: 88 MMHG | HEIGHT: 65 IN | WEIGHT: 293 LBS | SYSTOLIC BLOOD PRESSURE: 141 MMHG | BODY MASS INDEX: 48.82 KG/M2 | HEART RATE: 87 BPM

## 2019-08-20 DIAGNOSIS — F32.1 MODERATE MAJOR DEPRESSION (H): ICD-10-CM

## 2019-08-20 DIAGNOSIS — F41.1 GAD (GENERALIZED ANXIETY DISORDER): Primary | ICD-10-CM

## 2019-08-20 DIAGNOSIS — E66.01 MORBID OBESITY (H): ICD-10-CM

## 2019-08-20 PROCEDURE — 99214 OFFICE O/P EST MOD 30 MIN: CPT | Performed by: NURSE PRACTITIONER

## 2019-08-20 RX ORDER — BUSPIRONE HYDROCHLORIDE 15 MG/1
7.5 TABLET ORAL 2 TIMES DAILY
Qty: 30 TABLET | Refills: 1 | Status: SHIPPED | OUTPATIENT
Start: 2019-08-20 | End: 2020-05-26

## 2019-08-20 RX ORDER — CITALOPRAM HYDROBROMIDE 20 MG/1
20 TABLET ORAL DAILY
Qty: 30 TABLET | Refills: 1 | Status: SHIPPED | OUTPATIENT
Start: 2019-08-20 | End: 2020-05-26

## 2019-08-20 ASSESSMENT — ANXIETY QUESTIONNAIRES
5. BEING SO RESTLESS THAT IT IS HARD TO SIT STILL: MORE THAN HALF THE DAYS
2. NOT BEING ABLE TO STOP OR CONTROL WORRYING: MORE THAN HALF THE DAYS
IF YOU CHECKED OFF ANY PROBLEMS ON THIS QUESTIONNAIRE, HOW DIFFICULT HAVE THESE PROBLEMS MADE IT FOR YOU TO DO YOUR WORK, TAKE CARE OF THINGS AT HOME, OR GET ALONG WITH OTHER PEOPLE: SOMEWHAT DIFFICULT
3. WORRYING TOO MUCH ABOUT DIFFERENT THINGS: NEARLY EVERY DAY
GAD7 TOTAL SCORE: 14
7. FEELING AFRAID AS IF SOMETHING AWFUL MIGHT HAPPEN: MORE THAN HALF THE DAYS
6. BECOMING EASILY ANNOYED OR IRRITABLE: SEVERAL DAYS
1. FEELING NERVOUS, ANXIOUS, OR ON EDGE: MORE THAN HALF THE DAYS

## 2019-08-20 ASSESSMENT — PATIENT HEALTH QUESTIONNAIRE - PHQ9
5. POOR APPETITE OR OVEREATING: MORE THAN HALF THE DAYS
SUM OF ALL RESPONSES TO PHQ QUESTIONS 1-9: 18

## 2019-08-20 ASSESSMENT — MIFFLIN-ST. JEOR: SCORE: 2007.97

## 2019-08-20 NOTE — PATIENT INSTRUCTIONS
Patient Education     Selective Serotonin Reuptake Inhibitors  Your healthcare provider prescribed a selective serotonin reuptake inhibitor (SSRI) for you. An SSRI is a medicine that helps with depression (antidepressant). SSRIs can help you feel less sad or hopeless, and help you have more interest in life if you have depression. SSRIs are also used to treat panic disorder and obsessive compulsive disorder (OCD).     The name of my SSRI is ____________________________________________.   Guidelines for use    Follow the fact sheet that came with your medicine. It tells you when and how to take your medicine. Ask for a sheet if you didn t get one.    Before starting your medicine, tell your healthcare provider if you have:  ? Manic depression or bipolar disorder  ? Kidney disease  ? Thyroid disease  ? Diabetes  ? Liver disease  ? Seizure disorders  ? Past or current problems with drug abuse or dependence    Tell your healthcare provider about any other medicines you are taking. This includes over-the-counter or herbal medicines.    Take your medicine exactly as directed. This medicine takes several weeks to work as it should. Because of this, it is important to take this medicine every day. Do this even if you believe that it is not helping your symptoms. You may need to take this medicine for a few months. Or you may need to take it for the rest of your life. It depends on your symptoms.    If you miss a dose, take it as soon as you remember, unless it s almost time for your next dose. In that case, skip the dose you missed. Don t take a double dose.    Take your medicine with food.    Limit how much alcohol you drink while taking this medicine. Or if possible, don t have any alcohol at all while taking this medicine.    Don t take an SSRI if you are currently taking a monoamine oxidase inhibitor (MAO inhibitor).    Don t stop taking your medicine without talking with your healthcare provider. If you want to stop  taking your SSRI, your healthcare provider will need to help you reduce the medicine slowly.    Before using new over-the-counter medicines, check with the pharmacist to be sure it will not interact with the SSRI.    Don t share your medicine or use another person's medicine, even if it is the same medicine and dose. Check with your provider if you have trouble affording your prescription.  Possible side effects  Tell your healthcare provider if you have any of these side effects. Don t stop taking the medicine until your healthcare provider tells you to. Mild side effects include:    Anxiety    Trouble sleeping    Nausea    Diarrhea    Headaches    Loss of sex drive or problems with orgasm    Sweating     When to call your healthcare provider  Call your healthcare provider right away if you have any of the following:    Increased feelings of depression    Unusual joint or muscle pain    Trouble breathing    Shaking chills    Feelings of too much excitement    Trouble controlling your emotions or actions    Skin rash (hives)    Tremors   Date Last Reviewed: 5/1/2017 2000-2018 [employer]. [address] [place] [address], [place] [address]. All rights reserved. This information is not intended as a substitute for professional medical care. Always follow your healthcare professional's instructions.         please follow up with Dr Allen in 4 weeks  Call or come in to clinic before that if you have any problems or questions about the medications

## 2019-08-20 NOTE — PROGRESS NOTES
Subjective     Cata Etienne is a 49 year old female who presents to clinic today for the following health issues:    [facility]   ED/UC Followup:    Facility:   ED  Date of visit: 08/16/2019  Reason for visit:     Shortness of breath  Anxiety  Atypical chest pain  Pulmonary nodules  Medical Decision Making:  Cata Etienne is a 49 year old female who presents to the emergency department today for evaluation of shortness of breath and chest tightness who presented to the ED today for evaluation of chest pain. Details of the patient's history can be seen in the [facility]. Differential diagnosis included ACS, dissection, pneumothorax, pneumonia, PE, costochondritis, pericarditis, panic attack, gastric reflux, amongst others. Workup in the ED yielded no acute abnormalities with CBC or BMP. Initial troponin returned negative.  She has been having her symptoms for many days, I do not feel that this need to be repeated.  ECG did not show any evidence of STEMI. CXR was clear. The patient's heart score was found to be 2, low risk.  The patient was low risk on Wells criteria, but not PERC negative due to her age, d-dimer obtained, this returned elevated.  She did undergo a CT PE study, that returned showing evidence of pulmonary nodule, small hiatal hernia, but no other acute abnormalities.  The patient was incredibly tearful throughout her presentation here in the ED.  She did note multiple life stressors including her father's death and her mother's illness within the last few months.  I do suspect that she may have had a mild panic attack earlier today and she is suffering from anxiety.  I advised her to follow-up with her primary early next week to have further discussion of this, and potentially set up therapy.  She is in agreement with this plan.  She will otherwise return the emergency department for worsening chest pain, shortness of breath, nausea, diaphoresis, new concerns.  All questions were answered prior to discharge.  She  "is in agreement with the treatment plan.       Current Status: patient still having anxiety and having chest tightness     Attributes her stress to grief reaction over father's demise- no time to grieve  Would like something to calm her down    Patient Active Problem List   Diagnosis     Obesity     GERD (gastroesophageal reflux disease)     CARDIOVASCULAR SCREENING; LDL GOAL LESS THAN 160     Hypertension     Morbid obesity (H)     History reviewed. No pertinent surgical history.    Social History     Tobacco Use     Smoking status: Never Smoker     Smokeless tobacco: Never Used   Substance Use Topics     Alcohol use: Yes     Alcohol/week: 0.0 oz     Comment: occasional     Family History   Problem Relation Age of Onset     Breast Cancer Mother      Diabetes Mother      Obesity Mother      Hypertension Father      Cancer Paternal Grandfather         leukemia         Current Outpatient Medications   Medication Sig Dispense Refill     busPIRone (BUSPAR) 15 MG tablet Take 0.5 tablets (7.5 mg) by mouth 2 times daily 30 tablet 1     citalopram (CELEXA) 20 MG tablet Take 1 tablet (20 mg) by mouth daily 30 tablet 1     ibuprofen (ADVIL/MOTRIN) 200 MG tablet Take 200 mg by mouth every 4 hours as needed for mild pain       omeprazole (PRILOSEC) 20 MG DR capsule Take 1 capsule (20 mg) by mouth daily 90 capsule 1     No Known Allergies    Reviewed and updated as needed this visit by Provider         Review of Systems   ROS COMP: Constitutional, HEENT, cardiovascular, pulmonary, gi and gu systems are negative, except as otherwise noted.      Objective      BP (!) 141/88 (BP Location: Left arm, Patient Position: Sitting, Cuff Size: Adult Large)   Pulse 87   Temp 99.4  F (37.4  C) (Tympanic)   Ht 1.651 m (5' 5\")   Wt 139.2 kg (306 lb 14.4 oz)   LMP 08/07/2017 (Approximate)   SpO2 97%   BMI 51.07 kg/m    BP (!) 141/88 (BP Location: Left arm, Patient Position: Sitting, Cuff Size: Adult Large)   Pulse 87   Temp 99.4  F " "(37.4  C) (Tympanic)   Ht 1.651 m (5' 5\")   Wt 139.2 kg (306 lb 14.4 oz)   LMP 08/07/2017 (Approximate)   SpO2 97%   BMI 51.07 kg/m      Physical Exam   GENERAL: healthy, alert and no distress  NECK: no adenopathy, no asymmetry, masses, or scars and thyroid normal to palpation  RESP: lungs clear to auscultation - no rales, rhonchi or wheezes  CV: regular rate and rhythm, normal S1 S2, no S3 or S4, no murmur, click or rub, no peripheral edema and peripheral pulses strong  MS: no gross musculoskeletal defects noted, no edema  PSHYCH: ROBERT 7  14/21  PHQ9  18/27  With SI 0    Diagnostic Test Results:  Labs reviewed in Epic        Assessment & Plan       ICD-10-CM    1. ROBERT (generalized anxiety disorder) F41.1 busPIRone (BUSPAR) 15 MG tablet   2. Morbid obesity (H) E66.01    3. Moderate major depression (H) F32.1 citalopram (CELEXA) 20 MG tablet        Patient Instructions       Patient Education     Selective Serotonin Reuptake Inhibitors  Your healthcare provider prescribed a selective serotonin reuptake inhibitor (SSRI) for you. An SSRI is a medicine that helps with depression (antidepressant). SSRIs can help you feel less sad or hopeless, and help you have more interest in life if you have depression. SSRIs are also used to treat panic disorder and obsessive compulsive disorder (OCD).     The name of my SSRI is ____________________________________________.   Guidelines for use    Follow the fact sheet that came with your medicine. It tells you when and how to take your medicine. Ask for a sheet if you didn t get one.    Before starting your medicine, tell your healthcare provider if you have:  ? Manic depression or bipolar disorder  ? Kidney disease  ? Thyroid disease  ? Diabetes  ? Liver disease  ? Seizure disorders  ? Past or current problems with drug abuse or dependence    Tell your healthcare provider about any other medicines you are taking. This includes over-the-counter or herbal medicines.    Take your " medicine exactly as directed. This medicine takes several weeks to work as it should. Because of this, it is important to take this medicine every day. Do this even if you believe that it is not helping your symptoms. You may need to take this medicine for a few months. Or you may need to take it for the rest of your life. It depends on your symptoms.    If you miss a dose, take it as soon as you remember, unless it s almost time for your next dose. In that case, skip the dose you missed. Don t take a double dose.    Take your medicine with food.    Limit how much alcohol you drink while taking this medicine. Or if possible, don t have any alcohol at all while taking this medicine.    Don t take an SSRI if you are currently taking a monoamine oxidase inhibitor (MAO inhibitor).    Don t stop taking your medicine without talking with your healthcare provider. If you want to stop taking your SSRI, your healthcare provider will need to help you reduce the medicine slowly.    Before using new over-the-counter medicines, check with the pharmacist to be sure it will not interact with the SSRI.    Don t share your medicine or use another person's medicine, even if it is the same medicine and dose. Check with your provider if you have trouble affording your prescription.  Possible side effects  Tell your healthcare provider if you have any of these side effects. Don t stop taking the medicine until your healthcare provider tells you to. Mild side effects include:    Anxiety    Trouble sleeping    Nausea    Diarrhea    Headaches    Loss of sex drive or problems with orgasm    Sweating     When to call your healthcare provider  Call your healthcare provider right away if you have any of the following:    Increased feelings of depression    Unusual joint or muscle pain    Trouble breathing    Shaking chills    Feelings of too much excitement    Trouble controlling your emotions or actions    Skin rash (hives)    Tremors   Date  Last Reviewed: 5/1/2017 2000-2018 [employer]. [address] [place] [address], [place] [address]. All rights reserved. This information is not intended as a substitute for professional medical care. Always follow your healthcare professional's instructions.         please follow up with Dr Allen in 4 weeks  Call or come in to clinic before that if you have any problems or questions about the medications        ZHANNA [facility]

## 2019-08-21 ASSESSMENT — ANXIETY QUESTIONNAIRES: GAD7 TOTAL SCORE: 14

## 2019-09-11 DIAGNOSIS — F41.1 GAD (GENERALIZED ANXIETY DISORDER): ICD-10-CM

## 2019-09-11 DIAGNOSIS — F32.1 MODERATE MAJOR DEPRESSION (H): ICD-10-CM

## 2019-09-11 RX ORDER — CITALOPRAM HYDROBROMIDE 20 MG/1
TABLET ORAL
Start: 2019-09-11

## 2019-09-11 RX ORDER — BUSPIRONE HYDROCHLORIDE 15 MG/1
7.5 TABLET ORAL 2 TIMES DAILY
Start: 2019-09-11

## 2019-09-11 NOTE — TELEPHONE ENCOUNTER
"busPIRone (BUSPAR) 15 MG tablet 30 tablet 1 8/20/2019     citalopram (CELEXA) 20 MG tablet 30 tablet 1 8/20/2019      Last Written Prescription Date:  8/20/2019  Last Fill Quantity: 30,  # refills: 1   Last office visit: 8/20/2019 with prescribing provider: RODRIGO Wright    Future Office Visit: Unknown    PHQ-9 SCORE 8/20/2019   PHQ-9 Total Score 18     Requested Prescriptions   Pending Prescriptions Disp Refills     busPIRone (BUSPAR) 15 MG tablet [Pharmacy Med Name: BUSPIRONE HCL 15 MG TABLET] 30 tablet 1     Sig: TAKE 0.5 TABLETS (7.5 MG) BY MOUTH 2 TIMES DAILY       Atypical Antidepressants Protocol Failed - 9/11/2019 10:37 AM        Failed - Patient has PHQ-9 score less than 5 in past 6 months.     Please review last PHQ-9 score.           Passed - Medication active on med list        Passed - Patient is age 18 or older        Passed - No active pregnancy on record        Passed - No positive pregnancy test in past 12 mos        Passed - Recent (6 mo) or future (30 days) visit within the authorizing provider's specialty     Patient had office visit in the last 6 months or has a visit in the next 30 days with authorizing provider or within the authorizing provider's specialty.  See \"Patient Info\" tab in inbasket, or \"Choose Columns\" in Meds & Orders section of the refill encounter.            citalopram (CELEXA) 20 MG tablet [Pharmacy Med Name: CITALOPRAM HBR 20 MG TABLET] 30 tablet 1     Sig: TAKE 1 TABLET BY MOUTH EVERY DAY       SSRIs Protocol Failed - 9/11/2019 10:37 AM        Failed - PHQ-9 score less than 5 in past 6 months     Please review last PHQ-9 score.           Passed - Medication is active on med list        Passed - Patient is age 18 or older        Passed - No active pregnancy on record        Passed - No positive pregnancy test in last 12 months        Passed - Recent (6 mo) or future (30 days) visit within the authorizing provider's specialty     Patient had office visit in the last 6 months or has " "a visit in the next 30 days with authorizing provider or within the authorizing provider's specialty.  See \"Patient Info\" tab in inbasket, or \"Choose Columns\" in Meds & Orders section of the refill encounter.              "

## 2019-10-19 DIAGNOSIS — K21.9 GASTROESOPHAGEAL REFLUX DISEASE, ESOPHAGITIS PRESENCE NOT SPECIFIED: ICD-10-CM

## 2019-10-19 NOTE — TELEPHONE ENCOUNTER
"Requested Prescriptions   Pending Prescriptions Disp Refills     omeprazole (PRILOSEC) 20 MG DR capsule [Pharmacy Med Name: OMEPRAZOLE DR 20 MG CAPSULE]  Last Written Prescription Date:  4/19/2019  Last Fill Quantity: 90 cap,  # refills: 1   Last Office Visit: 8/20/2019 [place]  Future Office Visit:      90 capsule 1     Sig: TAKE 1 CAPSULE BY MOUTH EVERY DAY       PPI Protocol Passed - 10/19/2019 12:24 AM        Passed - Not on Clopidogrel (unless Pantoprazole ordered)        Passed - No diagnosis of osteoporosis on record        Passed - Recent (12 mo) or future (30 days) visit within the authorizing provider's specialty     Patient has had an office visit with the authorizing provider or a provider within the authorizing providers department within the previous 12 mos or has a future within next 30 days. See \"Patient Info\" tab in inbasket, or \"Choose Columns\" in Meds & Orders section of the refill encounter.              Passed - Medication is active on med list        Passed - Patient is age 18 or older        Passed - No active pregnacy on record        Passed - No positive pregnancy test in past 12 months        "

## 2020-01-26 DIAGNOSIS — K21.9 GASTROESOPHAGEAL REFLUX DISEASE, ESOPHAGITIS PRESENCE NOT SPECIFIED: ICD-10-CM

## 2020-01-28 NOTE — TELEPHONE ENCOUNTER
Prescription approved per [facility] Refill Protocol.    Joaquim PARKS RN

## 2020-01-28 NOTE — TELEPHONE ENCOUNTER
"Prilosec      Last Written Prescription Date:  10/22/19    Last Fill Quantity: 90,   # refills: 0  Last Office Visit: 8/20/19  Future Office visit:       Requested Prescriptions   Pending Prescriptions Disp Refills     omeprazole (PRILOSEC) 20 MG DR capsule [Pharmacy Med Name: OMEPRAZOLE DR 20 MG CAPSULE] 90 capsule 0     Sig: TAKE 1 CAPSULE BY MOUTH EVERY DAY       PPI Protocol Passed - 1/26/2020  1:02 AM        Passed - Not on Clopidogrel (unless Pantoprazole ordered)        Passed - No diagnosis of osteoporosis on record        Passed - Recent (12 mo) or future (30 days) visit within the authorizing provider's specialty     Patient has had an office visit with the authorizing provider or a provider within the authorizing providers department within the previous 12 mos or has a future within next 30 days. See \"Patient Info\" tab in inbasket, or \"Choose Columns\" in Meds & Orders section of the refill encounter.              Passed - Medication is active on med list        Passed - Patient is age 18 or older        Passed - No active pregnacy on record        Passed - No positive pregnancy test in past 12 months          "

## 2020-05-04 ENCOUNTER — OFFICE VISIT (OUTPATIENT)
Dept: URGENT CARE | Facility: URGENT CARE | Age: 50
End: 2020-05-04
Payer: COMMERCIAL

## 2020-05-04 ENCOUNTER — NURSE TRIAGE (OUTPATIENT)
Dept: FAMILY MEDICINE | Facility: CLINIC | Age: 50
End: 2020-05-04

## 2020-05-04 ENCOUNTER — ANCILLARY PROCEDURE (OUTPATIENT)
Dept: GENERAL RADIOLOGY | Facility: CLINIC | Age: 50
End: 2020-05-04
Attending: FAMILY MEDICINE
Payer: COMMERCIAL

## 2020-05-04 VITALS — RESPIRATION RATE: 16 BRPM | HEART RATE: 84 BPM | TEMPERATURE: 99.3 F | OXYGEN SATURATION: 100 %

## 2020-05-04 DIAGNOSIS — R07.89 CHEST TIGHTNESS: Primary | ICD-10-CM

## 2020-05-04 DIAGNOSIS — F41.9 ANXIETY: ICD-10-CM

## 2020-05-04 DIAGNOSIS — R06.02 SOB (SHORTNESS OF BREATH): ICD-10-CM

## 2020-05-04 DIAGNOSIS — R05.3 CHRONIC COUGH: ICD-10-CM

## 2020-05-04 LAB
BASOPHILS # BLD AUTO: 0 10E9/L (ref 0–0.2)
BASOPHILS NFR BLD AUTO: 0.3 %
D DIMER PPP FEU-MCNC: 0.5 UG/ML FEU (ref 0–0.5)
DIFFERENTIAL METHOD BLD: ABNORMAL
EOSINOPHIL # BLD AUTO: 0.1 10E9/L (ref 0–0.7)
EOSINOPHIL NFR BLD AUTO: 0.7 %
ERYTHROCYTE [DISTWIDTH] IN BLOOD BY AUTOMATED COUNT: 14 % (ref 10–15)
HCT VFR BLD AUTO: 42.2 % (ref 35–47)
HGB BLD-MCNC: 13.6 G/DL (ref 11.7–15.7)
LYMPHOCYTES # BLD AUTO: 2.3 10E9/L (ref 0.8–5.3)
LYMPHOCYTES NFR BLD AUTO: 19.5 %
MCH RBC QN AUTO: 27.9 PG (ref 26.5–33)
MCHC RBC AUTO-ENTMCNC: 32.2 G/DL (ref 31.5–36.5)
MCV RBC AUTO: 87 FL (ref 78–100)
MONOCYTES # BLD AUTO: 0.6 10E9/L (ref 0–1.3)
MONOCYTES NFR BLD AUTO: 4.9 %
NEUTROPHILS # BLD AUTO: 8.8 10E9/L (ref 1.6–8.3)
NEUTROPHILS NFR BLD AUTO: 74.6 %
PLATELET # BLD AUTO: 343 10E9/L (ref 150–450)
RBC # BLD AUTO: 4.88 10E12/L (ref 3.8–5.2)
TROPONIN I SERPL-MCNC: <0.015 UG/L (ref 0–0.04)
WBC # BLD AUTO: 11.8 10E9/L (ref 4–11)

## 2020-05-04 PROCEDURE — 85025 COMPLETE CBC W/AUTO DIFF WBC: CPT | Performed by: FAMILY MEDICINE

## 2020-05-04 PROCEDURE — 36415 COLL VENOUS BLD VENIPUNCTURE: CPT | Performed by: FAMILY MEDICINE

## 2020-05-04 PROCEDURE — 99214 OFFICE O/P EST MOD 30 MIN: CPT | Performed by: FAMILY MEDICINE

## 2020-05-04 PROCEDURE — 71046 X-RAY EXAM CHEST 2 VIEWS: CPT

## 2020-05-04 PROCEDURE — 93000 ELECTROCARDIOGRAM COMPLETE: CPT | Performed by: FAMILY MEDICINE

## 2020-05-04 PROCEDURE — 84484 ASSAY OF TROPONIN QUANT: CPT | Performed by: FAMILY MEDICINE

## 2020-05-04 PROCEDURE — 85379 FIBRIN DEGRADATION QUANT: CPT | Performed by: FAMILY MEDICINE

## 2020-05-04 RX ORDER — ALPRAZOLAM 0.5 MG
0.5 TABLET ORAL 3 TIMES DAILY PRN
Qty: 12 TABLET | Refills: 0 | Status: SHIPPED | OUTPATIENT
Start: 2020-05-04 | End: 2020-05-26

## 2020-05-04 NOTE — PROGRESS NOTES
SUBJECTIVE: Cata Etienne is a 49 year old female presenting with a chief complaint of Chest tightness and slight SOB along with a Chronic cough.  Onset of symptoms was day(s) ago for Chest tightness.  Course of illness is same.    Severity mild  Current and Associated symptoms: anxiety  Treatment measures tried include None tried.  Predisposing factors include HX of anxiety with similar episode that was w/u at ED and told anxiety.    Past Medical History:   Diagnosis Date     HTN (hypertension)      Obesity        No past surgical history on file.    Family History   Problem Relation Age of Onset     Breast Cancer Mother      Diabetes Mother      Obesity Mother      Hypertension Father      Cancer Paternal Grandfather         leukemia       Social History     Tobacco Use     Smoking status: Never Smoker     Smokeless tobacco: Never Used   Substance Use Topics     Alcohol use: Yes     Alcohol/week: 0.0 standard drinks     Comment: occasional      No Known Allergies    Review Of Systems  Skin: negative  Eyes: negative  Ears/Nose/Throat: negative  Respiratory: as above  Cardiovascular: as above  Gastrointestinal: negative  Genitourinary: negative  Musculoskeletal: negative  Neurologic: negative  Psychiatric: as above      OBJECTIVE:  Pulse 84   Temp 99.3  F (37.4  C)   Resp 16   LMP 08/07/2017 (Approximate)   SpO2 100%    GENERAL APPEARANCE: healthy, alert and no distress  EYES: EOMI,  PERRL, conjunctiva clear  HENT: ear canals and TM's normal.  Nose and mouth without ulcers, erythema or lesions  NECK: supple, nontender, no lymphadenopathy  RESP: lungs clear to auscultation - no rales, rhonchi or wheezes  CV: regular rates and rhythm, normal S1 S2, no murmur noted  ABDOMEN:  soft, nontender, no HSM or masses and bowel sounds normal  NEURO: Normal strength and tone, sensory exam grossly normal,  normal speech and mentation  SKIN: no suspicious lesions or rashes    EKG NSR without st changes    Xray without acute  findings, no pneumonia read by Favian Damon D.O.      ICD-10-CM    1. Chest tightness  R07.89 CBC with platelets differential     Troponin I     D dimer quantitative     EKG 12-lead complete w/read - Clinics     XR Chest 2 Views   2. SOB (shortness of breath)  R06.02 CBC with platelets differential     Troponin I     D dimer quantitative     EKG 12-lead complete w/read - Clinics     XR Chest 2 Views   3. Chronic cough  R05 CBC with platelets differential     Troponin I     D dimer quantitative     XR Chest 2 Views   4. Anxiety  F41.9 CBC with platelets differential     Troponin I     D dimer quantitative     ALPRAZolam (XANAX) 0.5 MG tablet     No MI by negative Troponin and normal EKG, no PE with negative D Dimer  Suspect Anxiety related  Fluids/Rest, f/u if worse/not any better

## 2020-05-04 NOTE — TELEPHONE ENCOUNTER
Reason for call:  Patient reporting a symptom    Symptom or request: SOB, tightness in chest    Duration (how long have symptoms been present): 05.01.2020    Have you been treated for this before? No    Additional comments: Pt finds it hard to sleep. Has had anxiety attacks in the past and is unsure if this is related    Phone Number patient can be reached at:  Cell number on file:    Telephone Information:   Mobile [phone]     Best Time:  any    Can we leave a detailed message on this number:  YES    Call taken on 5/4/2020 at 9:21 AM by Any Eduardo    *transferred to triage

## 2020-05-04 NOTE — TELEPHONE ENCOUNTER
"R:  for evaluation/EKG     CALL BACK/Proceed to ER IF:  * Severe difficulty breathing occurs  * Fever more than 100.5 F (38.1 C)  * You become worse    Pt verbalized agreement to plan     Additional Information    MILD difficulty breathing (e.g., minimal/no SOB at rest, SOB with walking, pulse < 100) of new onset or worse than normal    Answer Assessment - Initial Assessment Questions  1. RESPIRATORY STATUS: \"Describe your breathing?\" (e.g., wheezing, shortness of breath, unable to speak, severe coughing)         Feels tightness in chest   Difficult to get comfortable to sleep   Comes/goes, mostly at night   Pt is speaking in full, long sentences    2. ONSET: \"When did this breathing problem begin?\"     Friday afternoon     3. PATTERN \"Does the difficult breathing come and go, or has it been constant since it started?\"     Comes/goes     4. SEVERITY: \"How bad is your breathing?\" (e.g., mild, moderate, severe)       - MODERATE: SOB at rest, SOB with minimal exertion and prefers to sit, cannot lie down flat, speaks in phrases, mild retractions, audible wheezing, pulse 100-120.     5. RECURRENT SYMPTOM: \"Have you had difficulty breathing before?\" If so, ask: \"When was the last time?\" and \"What happened that time?\"       Denies     6. CARDIAC HISTORY: \"Do you have any history of heart disease?\" (e.g., heart attack, angina, bypass surgery, angioplasty)       Hypertension     7. LUNG HISTORY: \"Do you have any history of lung disease?\"  (e.g., pulmonary embolus, asthma, emphysema)    Denies     8. CAUSE: \"What do you think is causing the breathing problem?\"     Possible COVID?   Possible Anxiety     9. OTHER SYMPTOMS: \"Do you have any other symptoms? (e.g., dizziness, runny nose, cough, chest pain, fever)    Occasional cough for a long time     11. TRAVEL: \"Have you traveled out of the country in the last month?\" (e.g., travel history, exposures)    Denies    Protocols used: BREATHING DIFFICULTY-A-[place]      "

## 2020-05-26 ENCOUNTER — VIRTUAL VISIT (OUTPATIENT)
Dept: FAMILY MEDICINE | Facility: CLINIC | Age: 50
End: 2020-05-26
Payer: COMMERCIAL

## 2020-05-26 ENCOUNTER — TELEPHONE (OUTPATIENT)
Dept: FAMILY MEDICINE | Facility: CLINIC | Age: 50
End: 2020-05-26

## 2020-05-26 DIAGNOSIS — F41.8 SITUATIONAL ANXIETY: Primary | ICD-10-CM

## 2020-05-26 PROCEDURE — 99213 OFFICE O/P EST LOW 20 MIN: CPT | Mod: TEL | Performed by: INTERNAL MEDICINE

## 2020-05-26 RX ORDER — ALPRAZOLAM 0.5 MG
0.5 TABLET ORAL 3 TIMES DAILY PRN
Qty: 60 TABLET | Refills: 0 | Status: SHIPPED | OUTPATIENT
Start: 2020-05-26 | End: 2021-03-10

## 2020-05-26 ASSESSMENT — ENCOUNTER SYMPTOMS
DIARRHEA: 0
DYSPHORIC MOOD: 0
DIZZINESS: 0
PALPITATIONS: 0
SLEEP DISTURBANCE: 1
MYALGIAS: 0
HALLUCINATIONS: 0
NAUSEA: 0
LIGHT-HEADEDNESS: 0
HEADACHES: 0
VOMITING: 0
FATIGUE: 0
ABDOMINAL PAIN: 0
NERVOUS/ANXIOUS: 1
CONSTIPATION: 0
SHORTNESS OF BREATH: 0

## 2020-05-26 NOTE — TELEPHONE ENCOUNTER
[phone] - [employer] cell phone     Can this pt be worked in for video visit with you today, for reoccuring panic attacks.  Was started on Xanex in  on 5/4/20, but is not currently taking any other medications to address sx.     Pt called this am with Sx of panic attack.  SOB/Difficulty breathing.  Was seen in urgent care 5/4 with workup for anx sx (EKG, ddimer, tropinin).  Was given script of xanex at that time, and has taken on average one pill a day, but out of this med for 2 days and symptoms have started again.    Describes sx as exactly same as other attacks, no worse. Denies chest pain.     Was given 2 scripts for buspar and celexa on 8/20/19 for similar sx, but never started as symptoms have improved.    Plan: Video visit today to address symptoms and medication    Lauren Schreiber RN

## 2020-05-26 NOTE — PROGRESS NOTES
"Cata Etienne is a 50 year old female who is being evaluated via a billable telephone visit.      The patient has been notified of following:     \"This telephone visit will be conducted via a call between you and your physician/provider. We have found that certain health care needs can be provided without the need for an in-person physical exam.  This service lets us provide the care you need with a telephone conversation.  If a prescription is necessary we can send it directly to your pharmacy.  If lab work is needed we can place an order for that and you can then stop by our lab to have the test done at a later time.    Telephone visits are billed at different rates depending on your insurance coverage.  Please reach out to your insurance provider with any questions.    If during the course of the call the physician/provider feels a telephone visit is not appropriate, you will not be charged for this service.\"    Patient has given verbal consent for telephone visit? Yes         Patient has been going through quite a bit of stress since late last year.  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  This has led to a few episodes of panic attacks manifesting as shortness of breath and chest tightness -- she presented to urgent care with the symptoms on 5/4/2020 and work-up was unremarkable.  Patient was discharged on as-needed alprazolam which has been helpful.  She denies depression -- her PHQ9 score today 0.  She was previously prescribed with BuSpar and citalopram which she has not taken since she opted for counseling instead which has been very helpful until it had to cease given the current pandemic.      Review of Systems   Constitutional: Negative for fatigue.   Eyes: Negative for visual disturbance.   Respiratory: Negative for shortness of breath.    Cardiovascular: Negative for chest pain and " palpitations.   Gastrointestinal: Negative for abdominal pain, constipation, diarrhea, nausea and vomiting.   Musculoskeletal: Negative for myalgias.   Neurological: Negative for dizziness, light-headedness and headaches.   Psychiatric/Behavioral: Positive for sleep disturbance. Negative for dysphoric mood, hallucinations, self-injury and suicidal ideas. The patient is nervous/anxious.          ICD-10-CM    1. Situational anxiety  F41.8 ALPRAZolam (XANAX) 0.5 MG tablet       Total time spent with the patient over the phone was 7 minutes.      Dr. Eulogio Allen

## 2020-05-26 NOTE — TELEPHONE ENCOUNTER
Reason for call:  Patient reporting a symptom    Symptom or request: Anxiety attack: SOB, chest tightness    Duration (how long have symptoms been present): 05.23.2020    Have you been treated for this before? Yes    Additional comments: Pt has experienced this before    Phone Number patient can be reached at:  Home number on file [phone] (home)    Best Time:  any    Can we leave a detailed message on this number:  YES    Call taken on 5/26/2020 at 7:47 AM by Any Eduardo    *transferred to triage

## 2020-06-18 ENCOUNTER — TELEPHONE (OUTPATIENT)
Dept: FAMILY MEDICINE | Facility: CLINIC | Age: 50
End: 2020-06-18

## 2020-06-18 NOTE — TELEPHONE ENCOUNTER
Panel Management Review      Patient has the following on her problem list: None      Composite cancer screening  Chart review shows that this patient is due/due soon for the following Pap Smear, Mammogram and Colonoscopy  Summary:    Patient is due/failing the following:   COLONOSCOPY, MAMMOGRAM and PAP    Action needed:   Obtain results    Type of outreach:    Sent letter.    Questions for provider review:    None                                                                                                                                    Salo De La Paz CMA on 6/18/2020 at 4:11 PM

## 2020-07-07 ENCOUNTER — VIRTUAL VISIT (OUTPATIENT)
Dept: FAMILY MEDICINE | Facility: CLINIC | Age: 50
End: 2020-07-07
Payer: COMMERCIAL

## 2020-07-07 DIAGNOSIS — F41.1 GAD (GENERALIZED ANXIETY DISORDER): Primary | ICD-10-CM

## 2020-07-07 DIAGNOSIS — N95.1 MENOPAUSAL SYNDROME (HOT FLASHES): ICD-10-CM

## 2020-07-07 PROCEDURE — 99213 OFFICE O/P EST LOW 20 MIN: CPT | Mod: TEL | Performed by: INTERNAL MEDICINE

## 2020-07-07 RX ORDER — CITALOPRAM HYDROBROMIDE 20 MG/1
20 TABLET ORAL DAILY
Qty: 30 TABLET | Refills: 1 | Status: SHIPPED | OUTPATIENT
Start: 2020-07-07 | End: 2020-09-10

## 2020-07-07 NOTE — PROGRESS NOTES
"Cata Etienne is a 50 year old female who is being evaluated via a billable telephone visit.      The patient has been notified of following:     \"This telephone visit will be conducted via a call between you and your physician/provider. We have found that certain health care needs can be provided without the need for a physical exam.  This service lets us provide the care you need with a short phone conversation.  If a prescription is necessary we can send it directly to your pharmacy.  If lab work is needed we can place an order for that and you can then stop by our lab to have the test done at a later time.    Telephone visits are billed at different rates depending on your insurance coverage. During this emergency period, for some insurers they may be billed the same as an in-person visit.  Please reach out to your insurance provider with any questions.    If during the course of the call the physician/provider feels a telephone visit is not appropriate, you will not be charged for this service.\"    Patient has given verbal consent for Telephone visit?  Yes    What phone number would you like to be contacted at? [phone]    How would you like to obtain your AVS? MyChart    Subjective     Cata Etienne is a 50 year old female who presents via phone visit today for the following health issues:    HPI    C/o Hot flashes and worsening anxiety      Review of Systems   Constitutional: Negative for fatigue.   Eyes: Negative for visual disturbance.   Respiratory: Negative for shortness of breath.    Cardiovascular: Negative for chest pain and palpitations.   Gastrointestinal: Negative for abdominal pain, constipation, diarrhea, nausea and vomiting.   Musculoskeletal: Negative for myalgias.   Neurological: Negative for dizziness, light-headedness and headaches.   Psychiatric/Behavioral: Positive for sleep disturbance. Negative for dysphoric mood, hallucinations, self-injury and suicidal ideas. The patient is " nervous/anxious.          ICD-10-CM    1. ROBERT (generalized anxiety disorder)  F41.1 citalopram (CELEXA) 20 MG tablet   2. Menopausal syndrome (hot flashes)  N95.1 citalopram (CELEXA) 20 MG tablet       Total time spent with the patient over the phone was 15 minutes.      Dr. Eulogio Allen

## 2020-07-08 ASSESSMENT — ENCOUNTER SYMPTOMS
FATIGUE: 0
LIGHT-HEADEDNESS: 0
DYSPHORIC MOOD: 0
HEADACHES: 0
HALLUCINATIONS: 0
DIZZINESS: 0
ABDOMINAL PAIN: 0
PALPITATIONS: 0
SLEEP DISTURBANCE: 1
SHORTNESS OF BREATH: 0
CONSTIPATION: 0
DIARRHEA: 0
NAUSEA: 0
MYALGIAS: 0
NERVOUS/ANXIOUS: 1
VOMITING: 0

## 2020-08-06 ENCOUNTER — TELEPHONE (OUTPATIENT)
Dept: FAMILY MEDICINE | Facility: CLINIC | Age: 50
End: 2020-08-06

## 2020-08-06 NOTE — LETTER
[place] [address] [address] [facility] [id number] [phone]        August 6, 2020  Cata Etienne  [address]   [place] [address]    Dear Cata,    I care about your health and have reviewed your health plan. I have reviewed your medical conditions, medication list, and lab results and am making recommendations based on this review, to better manage your health.    You are in particular need of attention regarding:  -Depression  -Breast Cancer Screening  -Colon Cancer Screening  -Cervical Cancer Screening  -Wellness (Physical) Visit     I am recommending that you:  -schedule a WELLNESS (Physical) APPOINTMENT with me.   I will check fasting labs the same day - nothing to eat except water and meds for 8-10 hours prior.  -schedule a MAMMOGRAM which is due. We have a mobile mammogram unit that comes to the [facility] every Tuesday from 8:00am to 4:45pm. To schedule just call the clinic at [phone]. Or, you can call [facility] at [phone] to schedule this.  Please disregard this reminder if you have had this exam elsewhere within the last year.  It would be helpful for us to have a copy of your mammogram report in our file so that we can best coordinate your care.  -schedule a COLONOSCOPY to look for colon cancer (due every 10 years or 5 years in higher risk situations.)   Colon cancer is now the second leading cause of death in the [place] for both men and women and there are over 130,000 new cases and 50,000 deaths per year from colon cancer.  Colonoscopies can prevent 90-95% of these deaths.  Problem lesions can be removed before they ever become cancer.  This test is not only looking for cancer, but also getting rid of precancerious lesions.  If you do not wish to do a colonoscopy or cannot afford to do one, at this time, there is another option. It is called a FIT test or Fecal Immunochemical Occult Blood Test (take  home stool sample kit).  It does not replace the colonoscopy for colorectal cancer screening, but it can detect hidden bleeding in the lower colon.  It does need to be repeated every year and if a positive result is obtained, you would be referred for a colonoscopy.  If you have completed either one of these tests at another facility, please have the records sent to our clinic so that we can best coordinate your care.  -schedule a PAP SMEAR EXAM which is due.  Please disregard this reminder if you have had this exam elsewhere within the last year.  It would be helpful for us to have a copy of your recent pap smear report in our file so that we can best coordinate your care.       Here is a list of Health Maintenance topics that are due now or due soon:  Health Maintenance Due   Topic Date Due     PREVENTIVE CARE VISIT  1970     DEPRESSION ACTION PLAN  1970     MAMMO SCREENING  1970     COLORECTAL CANCER SCREENING  05/26/1978     HIV SCREENING  05/26/1983     LIPID  09/16/2016     ZOSTER IMMUNIZATION (1 of 2) 05/26/2018     PAP  07/01/2018     PHQ-9  02/20/2020       Please call us at [phone] (or use [phone]) to address the above recommendations.     Thank you for trusting [facility] and we appreciate the opportunity to serve you.  We look forward to supporting your healthcare needs in the future.    Healthy Regards,    Eulogio Allen MD  pedro

## 2020-08-06 NOTE — TELEPHONE ENCOUNTER
Panel Management Review      Patient has the following on her problem list:     Depression / Dysthymia review    Measure:  Needs PHQ-9 score of 4 or less during index window.  Administer PHQ-9 and if score is 5 or more, send encounter to provider for next steps.    5 - 7 month window range: none    PHQ-9 SCORE 8/20/2019   PHQ-9 Total Score 18       If PHQ-9 recheck is 5 or more, route to provider for next steps.    Patient is due for:  PHQ9      Composite cancer screening  Chart review shows that this patient is due/due soon for the following Pap Smear, Mammogram and Colonoscopy  Summary:    Patient is due/failing the following:   COLONOSCOPY, MAMMOGRAM, PAP, PHQ9 and PHYSICAL    Action needed:   Patient needs office visit for physical.    Type of outreach:    Letter    Questions for provider review:    None                                                                                                                                    Aylin Sepulveda MA       Chart routed to  .

## 2020-09-08 DIAGNOSIS — F41.1 GAD (GENERALIZED ANXIETY DISORDER): ICD-10-CM

## 2020-09-08 DIAGNOSIS — N95.1 MENOPAUSAL SYNDROME (HOT FLASHES): ICD-10-CM

## 2020-09-08 NOTE — TELEPHONE ENCOUNTER
Refill request:    CITALOPRAM HBR 20 MG TABLET    Summary: Take 1 tablet (20 mg) by mouth daily, Disp-30 tablet,R-1, E-Prescribe   Dose, Route, Frequency: 20 mg, Oral, DAILY  Start: 7/7/2020  Ord/Sold: 7/7/2020

## 2020-09-10 NOTE — TELEPHONE ENCOUNTER
Routing refill request to provider for review/approval because:  Restarted on celexa in July but one 1 refill given.  Please authorize if appropriate.  Thanks,  Fernanda Wagner RN

## 2020-09-11 RX ORDER — CITALOPRAM HYDROBROMIDE 20 MG/1
20 TABLET ORAL DAILY
Qty: 30 TABLET | Refills: 0 | Status: SHIPPED | OUTPATIENT
Start: 2020-09-11 | End: 2020-10-09

## 2020-10-06 DIAGNOSIS — K21.9 GERD (GASTROESOPHAGEAL REFLUX DISEASE): Primary | ICD-10-CM

## 2020-10-06 NOTE — TELEPHONE ENCOUNTER
Refill request:    OMEPRAZOLE DR 20 MG CAPSULE    Summary: TAKE 1 CAPSULE BY MOUTH DAILY, NEEDS APPT, Disp-90 capsule,R-1, E-Prescribe   Start: 4/29/2020  Ord/Sold: 4/29/2020

## 2020-10-08 DIAGNOSIS — F41.1 GAD (GENERALIZED ANXIETY DISORDER): ICD-10-CM

## 2020-10-08 DIAGNOSIS — N95.1 MENOPAUSAL SYNDROME (HOT FLASHES): ICD-10-CM

## 2020-10-09 RX ORDER — CITALOPRAM HYDROBROMIDE 20 MG/1
20 TABLET ORAL DAILY
Qty: 90 TABLET | Refills: 0 | Status: SHIPPED | OUTPATIENT
Start: 2020-10-09 | End: 2021-01-05

## 2020-10-09 NOTE — TELEPHONE ENCOUNTER
Left non detailed VM for pt asking that they callback and schedule f/u and appt with new PCP  Lucía WHITE RN

## 2021-03-10 ENCOUNTER — VIRTUAL VISIT (OUTPATIENT)
Dept: FAMILY MEDICINE | Facility: CLINIC | Age: 51
End: 2021-03-10
Payer: COMMERCIAL

## 2021-03-10 DIAGNOSIS — K21.00 GASTROESOPHAGEAL REFLUX DISEASE WITH ESOPHAGITIS WITHOUT HEMORRHAGE: ICD-10-CM

## 2021-03-10 DIAGNOSIS — F41.1 GAD (GENERALIZED ANXIETY DISORDER): Primary | ICD-10-CM

## 2021-03-10 PROCEDURE — 99213 OFFICE O/P EST LOW 20 MIN: CPT | Mod: GT | Performed by: INTERNAL MEDICINE

## 2021-03-10 RX ORDER — CITALOPRAM HYDROBROMIDE 10 MG/1
TABLET ORAL
Qty: 60 TABLET | Refills: 1 | Status: SHIPPED | OUTPATIENT
Start: 2021-03-10 | End: 2021-07-28 | Stop reason: DRUGHIGH

## 2021-03-10 ASSESSMENT — ANXIETY QUESTIONNAIRES
GAD7 TOTAL SCORE: 6
3. WORRYING TOO MUCH ABOUT DIFFERENT THINGS: NOT AT ALL
1. FEELING NERVOUS, ANXIOUS, OR ON EDGE: NEARLY EVERY DAY
IF YOU CHECKED OFF ANY PROBLEMS ON THIS QUESTIONNAIRE, HOW DIFFICULT HAVE THESE PROBLEMS MADE IT FOR YOU TO DO YOUR WORK, TAKE CARE OF THINGS AT HOME, OR GET ALONG WITH OTHER PEOPLE: SOMEWHAT DIFFICULT
5. BEING SO RESTLESS THAT IT IS HARD TO SIT STILL: SEVERAL DAYS
2. NOT BEING ABLE TO STOP OR CONTROL WORRYING: SEVERAL DAYS
6. BECOMING EASILY ANNOYED OR IRRITABLE: NOT AT ALL
7. FEELING AFRAID AS IF SOMETHING AWFUL MIGHT HAPPEN: NOT AT ALL

## 2021-03-10 ASSESSMENT — PATIENT HEALTH QUESTIONNAIRE - PHQ9
SUM OF ALL RESPONSES TO PHQ QUESTIONS 1-9: 3
5. POOR APPETITE OR OVEREATING: SEVERAL DAYS

## 2021-03-10 NOTE — PROGRESS NOTES
Cata is a 50 year old who is being evaluated via a billable video visit.      How would you like to obtain your AVS? MyChart  If the video visit is dropped, the invitation should be resent by: Text to cell phone: [phone]  Will anyone else be joining your video visit? No    Video Start Time: 9:05 AM    Assessment & Plan     ROBERT (generalized anxiety disorder)  Generalized anxiety exacerbated by going back to work.  She is concerned about COVID-19.    Does have some seasonal affective disorder by history additionally.  Has a history of depression.    We will restart her Celexa 10 mg increasing to 20 mg as tolerated         See Patient Instructions    No follow-ups on file.    Favian Pedraza MD  [facility]    Subjective   Cata is a 50 year old who presents for the following health issues     HPI     Anxiety, symptoms have stayed about the same and has recently increased a little more with recent information about returning to work after working from home for the last year    Review of Systems   Constitutional, HEENT, cardiovascular, pulmonary, gi and gu systems are negative, except as otherwise noted.      Objective           Vitals:  No vitals were obtained today due to virtual visit.    Physical Exam   Video examination was brief secondary to audio difficulties.  Affect appears somewhat flat mood minimally depressed.  Diminished energy, anxiety.  No hopelessness or helplessness          Video-Visit Details    Type of service:  Video Visit    Video End Time:9:25 AM    Originating Location (pt. Location): Home    Distant Location (provider location):  [facility]     Platform used for Video Visit: Doximity

## 2021-03-11 ASSESSMENT — ANXIETY QUESTIONNAIRES: GAD7 TOTAL SCORE: 6

## 2021-04-01 DIAGNOSIS — F41.1 GAD (GENERALIZED ANXIETY DISORDER): ICD-10-CM

## 2021-04-01 RX ORDER — CITALOPRAM HYDROBROMIDE 10 MG/1
TABLET ORAL
Qty: 60 TABLET | Refills: 1 | OUTPATIENT
Start: 2021-04-01

## 2021-04-01 NOTE — TELEPHONE ENCOUNTER
citalopram (CELEXA) 10 MG tablet 60 tablet 1 3/10/2021  --   Sig: One daily for one week, then two daily.   Sent to pharmacy as: Citalopram Hydrobromide 10 MG Oral Tablet (celeXA)   Class: E-Prescribe   Order: [id number]   E-Prescribing Status: Receipt confirmed by pharmacy (3/10/2021  9:27 AM CST)   Printout Tracking    External Result Report   Medication Administration Instructions    One daily for one week, then two daily.   Pharmacy    [facility] [id number] [place] [id number]     rx should have a refill on file

## 2021-05-03 ENCOUNTER — OFFICE VISIT (OUTPATIENT)
Dept: FAMILY MEDICINE | Facility: CLINIC | Age: 51
End: 2021-05-03
Payer: COMMERCIAL

## 2021-05-03 VITALS
OXYGEN SATURATION: 97 % | HEART RATE: 95 BPM | SYSTOLIC BLOOD PRESSURE: 141 MMHG | TEMPERATURE: 96.8 F | WEIGHT: 293 LBS | DIASTOLIC BLOOD PRESSURE: 90 MMHG | BODY MASS INDEX: 55.03 KG/M2

## 2021-05-03 DIAGNOSIS — I10 HYPERTENSION, UNSPECIFIED TYPE: ICD-10-CM

## 2021-05-03 DIAGNOSIS — F41.1 GAD (GENERALIZED ANXIETY DISORDER): Primary | ICD-10-CM

## 2021-05-03 DIAGNOSIS — D72.829 LEUKOCYTOSIS, UNSPECIFIED TYPE: ICD-10-CM

## 2021-05-03 DIAGNOSIS — Z13.220 LIPID SCREENING: ICD-10-CM

## 2021-05-03 LAB
ALBUMIN SERPL-MCNC: 3.3 G/DL (ref 3.4–5)
ALP SERPL-CCNC: 143 U/L (ref 40–150)
ALT SERPL W P-5'-P-CCNC: 24 U/L (ref 0–50)
ANION GAP SERPL CALCULATED.3IONS-SCNC: 4 MMOL/L (ref 3–14)
AST SERPL W P-5'-P-CCNC: 14 U/L (ref 0–45)
BILIRUB SERPL-MCNC: 0.3 MG/DL (ref 0.2–1.3)
BUN SERPL-MCNC: 12 MG/DL (ref 7–30)
CALCIUM SERPL-MCNC: 9 MG/DL (ref 8.5–10.1)
CHLORIDE SERPL-SCNC: 109 MMOL/L (ref 94–109)
CHOLEST SERPL-MCNC: 201 MG/DL
CO2 SERPL-SCNC: 26 MMOL/L (ref 20–32)
CREAT SERPL-MCNC: 0.75 MG/DL (ref 0.52–1.04)
ERYTHROCYTE [DISTWIDTH] IN BLOOD BY AUTOMATED COUNT: 14.3 % (ref 10–15)
GFR SERPL CREATININE-BSD FRML MDRD: >90 ML/MIN/{1.73_M2}
GLUCOSE SERPL-MCNC: 90 MG/DL (ref 70–99)
HCT VFR BLD AUTO: 40.9 % (ref 35–47)
HDLC SERPL-MCNC: 55 MG/DL
HGB BLD-MCNC: 13.4 G/DL (ref 11.7–15.7)
LDLC SERPL CALC-MCNC: 128 MG/DL
MCH RBC QN AUTO: 28.5 PG (ref 26.5–33)
MCHC RBC AUTO-ENTMCNC: 32.8 G/DL (ref 31.5–36.5)
MCV RBC AUTO: 87 FL (ref 78–100)
NONHDLC SERPL-MCNC: 146 MG/DL
PLATELET # BLD AUTO: 379 10E9/L (ref 150–450)
POTASSIUM SERPL-SCNC: 4 MMOL/L (ref 3.4–5.3)
PROT SERPL-MCNC: 7.5 G/DL (ref 6.8–8.8)
RBC # BLD AUTO: 4.7 10E12/L (ref 3.8–5.2)
SODIUM SERPL-SCNC: 139 MMOL/L (ref 133–144)
TRIGL SERPL-MCNC: 91 MG/DL
WBC # BLD AUTO: 8.9 10E9/L (ref 4–11)

## 2021-05-03 PROCEDURE — 36415 COLL VENOUS BLD VENIPUNCTURE: CPT | Performed by: INTERNAL MEDICINE

## 2021-05-03 PROCEDURE — 82043 UR ALBUMIN QUANTITATIVE: CPT | Performed by: INTERNAL MEDICINE

## 2021-05-03 PROCEDURE — 99214 OFFICE O/P EST MOD 30 MIN: CPT | Performed by: INTERNAL MEDICINE

## 2021-05-03 PROCEDURE — 80053 COMPREHEN METABOLIC PANEL: CPT | Performed by: INTERNAL MEDICINE

## 2021-05-03 PROCEDURE — 80061 LIPID PANEL: CPT | Performed by: INTERNAL MEDICINE

## 2021-05-03 PROCEDURE — 85027 COMPLETE CBC AUTOMATED: CPT | Performed by: INTERNAL MEDICINE

## 2021-05-03 RX ORDER — LISINOPRIL 20 MG/1
20 TABLET ORAL DAILY
Qty: 90 TABLET | Refills: 0 | Status: SHIPPED | OUTPATIENT
Start: 2021-05-03 | End: 2021-07-28

## 2021-05-03 RX ORDER — CITALOPRAM HYDROBROMIDE 20 MG/1
20 TABLET ORAL DAILY
Qty: 90 TABLET | Refills: 0 | Status: SHIPPED | OUTPATIENT
Start: 2021-05-03 | End: 2021-10-19

## 2021-05-03 ASSESSMENT — ANXIETY QUESTIONNAIRES
2. NOT BEING ABLE TO STOP OR CONTROL WORRYING: MORE THAN HALF THE DAYS
IF YOU CHECKED OFF ANY PROBLEMS ON THIS QUESTIONNAIRE, HOW DIFFICULT HAVE THESE PROBLEMS MADE IT FOR YOU TO DO YOUR WORK, TAKE CARE OF THINGS AT HOME, OR GET ALONG WITH OTHER PEOPLE: SOMEWHAT DIFFICULT
3. WORRYING TOO MUCH ABOUT DIFFERENT THINGS: MORE THAN HALF THE DAYS
GAD7 TOTAL SCORE: 11
5. BEING SO RESTLESS THAT IT IS HARD TO SIT STILL: SEVERAL DAYS
7. FEELING AFRAID AS IF SOMETHING AWFUL MIGHT HAPPEN: SEVERAL DAYS
6. BECOMING EASILY ANNOYED OR IRRITABLE: SEVERAL DAYS
1. FEELING NERVOUS, ANXIOUS, OR ON EDGE: MORE THAN HALF THE DAYS

## 2021-05-03 ASSESSMENT — PATIENT HEALTH QUESTIONNAIRE - PHQ9: 5. POOR APPETITE OR OVEREATING: MORE THAN HALF THE DAYS

## 2021-05-03 NOTE — PROGRESS NOTES
Assessment & Plan   Problem List Items Addressed This Visit        Circulatory    Hypertension    Relevant Medications    lisinopril (ZESTRIL) 20 MG tablet    Other Relevant Orders    Comprehensive metabolic panel (BMP + Alb, Alk Phos, ALT, AST, Total. Bili, TP)    Albumin Random Urine Quantitative with Creat Ratio    US Renal Complete w Doppler Complete      Other Visit Diagnoses     ROBERT (generalized anxiety disorder)    -  Primary    Relevant Medications    citalopram (CELEXA) 20 MG tablet    Leukocytosis, unspecified type        Relevant Orders    CBC with platelets    Lipid screening        Relevant Orders    Lipid panel reflex to direct LDL Fasting         Repeat blood pressure was elevated, patient does have history of hypertension untreated.  Discussed checking renal Doppler ultrasound baseline.  She was on lisinopril in the past without any side effects.  Restart on lisinopril 20 mg 1 tablet once daily continue to monitor blood pressure and call us with readings.  Encouraged on exercise activities and lifestyle changes she plans to start doing more walking.  She denies any history of sleep apnea or snoring.  Stressed importance of adequate sleep hygiene.  Discussed stress relieving techniques and anxiety.  We will increase the dose of citalopram to 20 mg once daily.  Discussed side effects of medications,  Recheck in 6 weeks check on the dose.  Patient having some is working reports and stressful work environment she is concerned also with Covid although she is fully vaccinated.  Discussed Covid prevention continues distancing masking etc.  Reassurance given could be that her anxiety also is feeling into her her blood pressure as well.  May consider counseling in future if medications not helping much.  Had elevated white blood cell count last checked x2 denies any history of smoking.  We will repeat CBC today.  Check CMP lipid panel urine microalbumin.           Work on weight loss  Regular  "exercise  See Patient Instructions    Return in about 6 weeks (around 6/14/2021), or if symptoms worsen or fail to improve, for As needed and if symptoms worsen, BP Recheck, anxiety, other.    Jhonny Pelaez MD  [facility] [place] Ivy is a 50 year old who presents for the following health issues   HPI     Chief Complaint   Patient presents with     Anxiety     Musculoskeletal Problem     Patient report lower back pain - report hx of arthritis on lower back pain      Hypertension     Patient report high BP episodes - stress at work        Was put on xanax and then Celexa, malik put her on such as well.     Has been on lisinopril, NOT TAKING, LOST 130 LBS, put some wt back     Now back in work, lot of anxiety being at work, works at [employer],  [employer] quit , owner     Moving to new system, stress full, working overtime,     Trying to exercise, to walk, started to walk in parking lot,     Sleep is \"not so good\" anxiety starts again,     celexa did not make gain weight, was advised to increase to 20 mg,     160/90, at work       Does not have snoring, does not wake up gasping for air, when she can sleep she wakes up refrershed    Has tried melatonin, tried sleep aids did not help    Has never smoked,     Had some elevation of wbc    Drinks rarely, social drinker..      Review of Systems   Constitutional, HEENT, cardiovascular, pulmonary, gi and gu systems are negative, except as otherwise noted.      Objective    BP (!) 141/90   Pulse 95   Temp 96.8  F (36  C) (Temporal)   Wt (!) 150 kg (330 lb 11.2 oz)   LMP 08/07/2017 (Approximate)   SpO2 97%   Breastfeeding No   BMI 55.03 kg/m    Body mass index is 55.03 kg/m .  Physical Exam   GENERAL: healthy, alert and no distress  EYES: Eyes grossly normal to inspection, PERRL and conjunctivae and sclerae normal  HENT: ear canals and TM's normal, nose and mouth without ulcers or lesions  NECK: no adenopathy, no asymmetry, masses, or " scars and thyroid normal to palpation  RESP: lungs clear to auscultation - no rales, rhonchi or wheezes  BREAST: normal without masses, tenderness or nipple discharge and no palpable axillary masses or adenopathy  CV: regular rate and rhythm, normal S1 S2, no S3 or S4, no murmur, click or rub, no peripheral edema and peripheral pulses strong  ABDOMEN: soft, nontender, no hepatosplenomegaly, no masses and bowel sounds normal  MS: no gross musculoskeletal defects noted, no edema  SKIN: no suspicious lesions or rashes  NEURO: Normal strength and tone, mentation intact and speech normal  PSYCH: mentation appears normal, affect normal/bright    Office Visit on 05/04/2020   Component Date Value Ref Range Status     WBC 05/04/2020 11.8* 4.0 - 11.0 10e9/L Final     RBC Count 05/04/2020 4.88  3.8 - 5.2 10e12/L Final     Hemoglobin 05/04/2020 13.6  11.7 - 15.7 g/dL Final     Hematocrit 05/04/2020 42.2  35.0 - 47.0 % Final     MCV 05/04/2020 87  78 - 100 fl Final     MCH 05/04/2020 27.9  26.5 - 33.0 pg Final     MCHC 05/04/2020 32.2  31.5 - 36.5 g/dL Final     RDW 05/04/2020 14.0  10.0 - 15.0 % Final     Platelet Count 05/04/2020 343  150 - 450 10e9/L Final     % Neutrophils 05/04/2020 74.6  % Final     % Lymphocytes 05/04/2020 19.5  % Final     % Monocytes 05/04/2020 4.9  % Final     % Eosinophils 05/04/2020 0.7  % Final     % Basophils 05/04/2020 0.3  % Final     Absolute Neutrophil 05/04/2020 8.8* 1.6 - 8.3 10e9/L Final     Absolute Lymphocytes 05/04/2020 2.3  0.8 - 5.3 10e9/L Final     Absolute Monocytes 05/04/2020 0.6  0.0 - 1.3 10e9/L Final     Absolute Eosinophils 05/04/2020 0.1  0.0 - 0.7 10e9/L Final     Absolute Basophils 05/04/2020 0.0  0.0 - 0.2 10e9/L Final     Diff Method 05/04/2020 Automated Method   Final     Troponin I ES 05/04/2020 <0.015  0.000 - 0.045 ug/L Final    Comment: The 99th percentile for upper reference range is 0.045 ug/L.  Troponin values   in the range of 0.045 - 0.120 ug/L may be associated  with risks of adverse   clinical events.       D Dimer 05/04/2020 0.5  0.0 - 0.50 ug/ml FEU Final    Comment: This D-dimer assay is intended for use in conjunction with a clinical pretest   probability assessment model to exclude pulmonary embolism (PE) and deep   venous thrombosis (DVT) in outpatients suspected of PE or DVT. The cut-off   value is 0.5 ug/mL FEU.

## 2021-05-04 LAB
CREAT UR-MCNC: 211 MG/DL
MICROALBUMIN UR-MCNC: 122 MG/L
MICROALBUMIN/CREAT UR: 57.82 MG/G CR (ref 0–25)

## 2021-05-04 ASSESSMENT — ANXIETY QUESTIONNAIRES: GAD7 TOTAL SCORE: 11

## 2021-07-27 DIAGNOSIS — I10 HYPERTENSION, UNSPECIFIED TYPE: ICD-10-CM

## 2021-07-28 ENCOUNTER — TELEPHONE (OUTPATIENT)
Dept: FAMILY MEDICINE | Facility: CLINIC | Age: 51
End: 2021-07-28

## 2021-07-28 RX ORDER — LISINOPRIL 20 MG/1
20 TABLET ORAL DAILY
Qty: 90 TABLET | Refills: 3 | Status: SHIPPED | OUTPATIENT
Start: 2021-07-28 | End: 2022-03-11

## 2021-07-28 NOTE — TELEPHONE ENCOUNTER
"PCP: patient calling had virtual appt today 7/28/21. States they never received call from provider. Writer notified patient refill for lisinopril sent to pharmacy. Clarified \"breast lump\" states that they spoke to you about it in visit in march. Patient states breast is painful and discolored, trying to get a mammogram but \"need reference\" from PCP to get mammogram. Writer rescheduled telephone visit tomorrow 7/29/30 at 530pm.     Patient called, stating they are here for appt with Dr. Pedraza. Writer clarified with provider MA.   appt at 11:30. Patient was sent link but not did not have visit with Dr. Pedraza. Per Note to MA from PCP, PCP   tried calling pt twice no answer, writer noticed patient who states \"markie got a record he has no answer\". Writer notified patient that lisinopril was refilled.     Clarified breast lump- talked about back in march? Unable to see documentation regarding this, patient states it Painful, discolored and patient \"Need reference to get mammogram\".      Ashutosh Ornelas RN  [facility]      "

## 2021-07-28 NOTE — TELEPHONE ENCOUNTER
Routing refill request to provider for review/approval because:  Drug not on the [facility] refill protocol due to B/P.     BP Readings from Last 3 Encounters:   05/03/21 (!) 141/90   08/20/19 (!) 141/88   08/16/19 139/82         Bonnie Dill RN  [facility] Triage

## 2021-07-29 ENCOUNTER — VIRTUAL VISIT (OUTPATIENT)
Dept: FAMILY MEDICINE | Facility: CLINIC | Age: 51
End: 2021-07-29
Payer: COMMERCIAL

## 2021-07-29 DIAGNOSIS — Z53.9 ERRONEOUS ENCOUNTER--DISREGARD: Primary | ICD-10-CM

## 2021-07-30 ENCOUNTER — OFFICE VISIT (OUTPATIENT)
Dept: FAMILY MEDICINE | Facility: CLINIC | Age: 51
End: 2021-07-30
Payer: COMMERCIAL

## 2021-07-30 VITALS
HEIGHT: 65 IN | HEART RATE: 89 BPM | TEMPERATURE: 99 F | SYSTOLIC BLOOD PRESSURE: 133 MMHG | BODY MASS INDEX: 48.82 KG/M2 | OXYGEN SATURATION: 95 % | DIASTOLIC BLOOD PRESSURE: 80 MMHG | WEIGHT: 293 LBS

## 2021-07-30 DIAGNOSIS — N60.02 CYST OF LEFT BREAST: Primary | ICD-10-CM

## 2021-07-30 PROCEDURE — 99213 OFFICE O/P EST LOW 20 MIN: CPT | Performed by: INTERNAL MEDICINE

## 2021-07-30 ASSESSMENT — MIFFLIN-ST. JEOR: SCORE: 2117.27

## 2021-07-30 NOTE — PROGRESS NOTES
"    Assessment & Plan     Cyst of left breast  Approximately 1.5 mL of purulence was removed from the superficial cyst involving the left breast. I would like to obtain an ultrasound and screening mammogram on this patient who has a family history of breast cancer and is behind schedule regarding surveillance.    - US Breast Left Limited 1-3 Quadrants; Future       BMI:   Estimated body mass index is 55.45 kg/m  as calculated from the following:    Height as of this encounter: 1.651 m (5' 5\").    Weight as of this encounter: 151.1 kg (333 lb 3.2 oz).   Patient working on diet and exercise    See Patient Instructions    No follow-ups on file.    Favian Pedraza MD  [facility] [place] Ivy is a 51 year old who presents for the following health issues     HPI 51-year-old with 2 to 3 months of gradually worsening left breast cyst. She states that the area is approximately at the bra strap and irritated by pressure. She does have a family history of breast carcinoma. Her father had esophageal carcinoma.    The region is tender. She denies fever. No drainage. Over the course of the last week or 2 its become more erythematous as well. Tenderness is increased. Left Breast lump    Review of Systems   Constitutional, HEENT, cardiovascular, pulmonary, gi and gu systems are negative, except as otherwise noted.      Objective    LMP 08/07/2017 (Approximate)   There is no height or weight on file to calculate BMI.  Physical Exam   Left breast upper outer quadrant there is a 1 x 1 cm mobile mass which was superficial and with erythematous skin. A 23-gauge needle was placed and more than 1.5 mL of purulent material was evacuated from the cyst. Patient had some relief of her discomfort.    Isopropyl alcohol was utilized and a bandage was applied over the region.    Office Visit on 05/03/2021   Component Date Value Ref Range Status     Sodium 05/03/2021 139  133 - 144 mmol/L Final     Potassium 05/03/2021 " 4.0  3.4 - 5.3 mmol/L Final     Chloride 05/03/2021 109  94 - 109 mmol/L Final     Carbon Dioxide 05/03/2021 26  20 - 32 mmol/L Final     Anion Gap 05/03/2021 4  3 - 14 mmol/L Final     Glucose 05/03/2021 90  70 - 99 mg/dL Final    Fasting specimen     Urea Nitrogen 05/03/2021 12  7 - 30 mg/dL Final     Creatinine 05/03/2021 0.75  0.52 - 1.04 mg/dL Final     GFR Estimate 05/03/2021 >90  >60 mL/min/[1.73_m2] Final    Comment: Non  GFR Calc  Starting 12/18/2018, serum creatinine based estimated GFR (eGFR) will be   calculated using the Chronic Kidney Disease Epidemiology Collaboration   (CKD-EPI) equation.       GFR Estimate If Black 05/03/2021 >90  >60 mL/min/[1.73_m2] Final    Comment:  GFR Calc  Starting 12/18/2018, serum creatinine based estimated GFR (eGFR) will be   calculated using the Chronic Kidney Disease Epidemiology Collaboration   (CKD-EPI) equation.       Calcium 05/03/2021 9.0  8.5 - 10.1 mg/dL Final     Bilirubin Total 05/03/2021 0.3  0.2 - 1.3 mg/dL Final     Albumin 05/03/2021 3.3* 3.4 - 5.0 g/dL Final     Protein Total 05/03/2021 7.5  6.8 - 8.8 g/dL Final     Alkaline Phosphatase 05/03/2021 143  40 - 150 U/L Final     ALT 05/03/2021 24  0 - 50 U/L Final     AST 05/03/2021 14  0 - 45 U/L Final     Creatinine Urine 05/03/2021 211  mg/dL Final     Albumin Urine mg/L 05/03/2021 122  mg/L Final     Albumin Urine mg/g Cr 05/03/2021 57.82* 0 - 25 mg/g Cr Final     WBC 05/03/2021 8.9  4.0 - 11.0 10e9/L Final     RBC Count 05/03/2021 4.70  3.8 - 5.2 10e12/L Final     Hemoglobin 05/03/2021 13.4  11.7 - 15.7 g/dL Final     Hematocrit 05/03/2021 40.9  35.0 - 47.0 % Final     MCV 05/03/2021 87  78 - 100 fl Final     MCH 05/03/2021 28.5  26.5 - 33.0 pg Final     MCHC 05/03/2021 32.8  31.5 - 36.5 g/dL Final     RDW 05/03/2021 14.3  10.0 - 15.0 % Final     Platelet Count 05/03/2021 379  150 - 450 10e9/L Final     Cholesterol 05/03/2021 201* <200 mg/dL Final    Desirable:       <200  mg/dl     Triglycerides 05/03/2021 91  <150 mg/dL Final    Fasting specimen     HDL Cholesterol 05/03/2021 55  >49 mg/dL Final     LDL Cholesterol Calculated 05/03/2021 128* <100 mg/dL Final    Comment: Above desirable:  100-129 mg/dl  Borderline High:  130-159 mg/dL  High:             160-189 mg/dL  Very high:       >189 mg/dl       Non HDL Cholesterol 05/03/2021 146* <130 mg/dL Final    Comment: Above Desirable:  130-159 mg/dl  Borderline high:  160-189 mg/dl  High:             190-219 mg/dl  Very high:       >219 mg/dl

## 2021-10-18 DIAGNOSIS — F41.1 GAD (GENERALIZED ANXIETY DISORDER): ICD-10-CM

## 2021-10-19 RX ORDER — CITALOPRAM HYDROBROMIDE 20 MG/1
TABLET ORAL
Qty: 90 TABLET | Refills: 0 | Status: SHIPPED | OUTPATIENT
Start: 2021-10-19 | End: 2022-01-20

## 2021-10-19 NOTE — TELEPHONE ENCOUNTER
ROBERT 5/3/21 was 11.  Takes for anxiety.    Please refill as appropriate.  Thank you,    Linda Valdes RN  [facility]

## 2021-11-08 DIAGNOSIS — K21.9 GERD (GASTROESOPHAGEAL REFLUX DISEASE): ICD-10-CM

## 2021-11-16 ENCOUNTER — OFFICE VISIT (OUTPATIENT)
Dept: FAMILY MEDICINE | Facility: CLINIC | Age: 51
End: 2021-11-16
Payer: COMMERCIAL

## 2021-11-16 VITALS
WEIGHT: 293 LBS | HEART RATE: 88 BPM | TEMPERATURE: 98.4 F | SYSTOLIC BLOOD PRESSURE: 140 MMHG | HEIGHT: 65 IN | OXYGEN SATURATION: 97 % | DIASTOLIC BLOOD PRESSURE: 81 MMHG | BODY MASS INDEX: 48.82 KG/M2

## 2021-11-16 DIAGNOSIS — R05.9 COUGH: Primary | ICD-10-CM

## 2021-11-16 LAB — SARS-COV-2 RNA RESP QL NAA+PROBE: NEGATIVE

## 2021-11-16 PROCEDURE — 99214 OFFICE O/P EST MOD 30 MIN: CPT | Performed by: INTERNAL MEDICINE

## 2021-11-16 PROCEDURE — U0005 INFEC AGEN DETEC AMPLI PROBE: HCPCS | Performed by: INTERNAL MEDICINE

## 2021-11-16 PROCEDURE — U0003 INFECTIOUS AGENT DETECTION BY NUCLEIC ACID (DNA OR RNA); SEVERE ACUTE RESPIRATORY SYNDROME CORONAVIRUS 2 (SARS-COV-2) (CORONAVIRUS DISEASE [COVID-19]), AMPLIFIED PROBE TECHNIQUE, MAKING USE OF HIGH THROUGHPUT TECHNOLOGIES AS DESCRIBED BY CMS-2020-01-R: HCPCS | Performed by: INTERNAL MEDICINE

## 2021-11-16 RX ORDER — CODEINE PHOSPHATE AND GUAIFENESIN 10; 100 MG/5ML; MG/5ML
1-2 SOLUTION ORAL
Qty: 118 ML | Refills: 0 | Status: SHIPPED | OUTPATIENT
Start: 2021-11-16 | End: 2021-11-20

## 2021-11-16 RX ORDER — BENZONATATE 100 MG/1
100 CAPSULE ORAL 3 TIMES DAILY PRN
Qty: 30 CAPSULE | Refills: 0 | Status: SHIPPED | OUTPATIENT
Start: 2021-11-16 | End: 2022-03-11

## 2021-11-16 RX ORDER — ALBUTEROL SULFATE 90 UG/1
2 AEROSOL, METERED RESPIRATORY (INHALATION) EVERY 4 HOURS PRN
Qty: 18 G | Refills: 0 | Status: SHIPPED | OUTPATIENT
Start: 2021-11-16

## 2021-11-16 ASSESSMENT — MIFFLIN-ST. JEOR: SCORE: 2162.62

## 2021-11-16 NOTE — PROGRESS NOTES
"  Assessment & Plan   Problem List Items Addressed This Visit     None      Visit Diagnoses     Cough    -  Primary    Relevant Medications    guaiFENesin-codeine (ROBITUSSIN AC) 100-10 MG/5ML solution    benzonatate (TESSALON) 100 MG capsule    albuterol (PROAIR HFA/PROVENTIL HFA/VENTOLIN HFA) 108 (90 Base) MCG/ACT inhaler    Other Relevant Orders    Symptomatic COVID-19 Virus (Coronavirus) by PCR Nose         Advised to keep well-hydrated, use Robitussin-AC as needed at bedtime, avoid driving when on such medication.  During the day can use benzonatate 3 times a day as needed for cough.  Albuterol as needed for any wheeze or shortness of breath.  Patient is not in any distress, her pulse ox was 97% on room air.         BMI:   Estimated body mass index is 57.11 kg/m  as calculated from the following:    Height as of this encounter: 1.651 m (5' 5\").    Weight as of this encounter: 155.7 kg (343 lb 3.2 oz).   Weight management plan: Discussed healthy diet and exercise guidelines    See Patient Instructions  Total time spent was 30 minutes review of records recommendation exam.  Return in about 1 week (around 11/23/2021), or if symptoms worsen or fail to improve, for As needed and if symptoms worsen, other; cough.    Jhonny Pelaez MD  [facility] [place] is a 51 year old who presents for the following health issues     HPI   Cough x 6 days  Hx bronchitis    Patient presenting for evaluation of cough of 5 days duration.  Cough is nonproductive.  Denies any shortness of breath or wheeze.  Not known to have asthma or smoking history.  Denies any pleuritic chest pain.  No phlegm production.  No fevers or chills.  No difficulty breathing.  No known sick contacts.  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  Patient she is fully vaccinated and even received a booster vaccine.    Review of Systems   Constitutional, HEENT, cardiovascular, pulmonary, gi and gu " "systems are negative, except as otherwise noted.      Objective    BP (!) 140/81 (BP Location: Right arm, Cuff Size: Adult Large)   Pulse 88   Temp 98.4  F (36.9  C) (Tympanic)   Ht 1.651 m (5' 5\")   Wt (!) 155.7 kg (343 lb 3.2 oz)   LMP 08/07/2017 (Approximate)   SpO2 97%   BMI 57.11 kg/m    Body mass index is 57.11 kg/m .  Physical Exam   GENERAL: healthy, alert and no distress  EYES: Eyes grossly normal to inspection, PERRL and conjunctivae and sclerae normal  RESP: lungs clear to auscultation - no rales, rhonchi or wheezes  CV: regular rate and rhythm, normal S1 S2, no S3 or S4, no murmur, click or rub, no peripheral edema and peripheral pulses strong  SKIN: no suspicious lesions or rashes  NEURO: Normal strength and tone, mentation intact and speech normal  PSYCH: mentation appears normal, affect normal/bright    Office Visit on 05/03/2021   Component Date Value Ref Range Status     Sodium 05/03/2021 139  133 - 144 mmol/L Final     Potassium 05/03/2021 4.0  3.4 - 5.3 mmol/L Final     Chloride 05/03/2021 109  94 - 109 mmol/L Final     Carbon Dioxide 05/03/2021 26  20 - 32 mmol/L Final     Anion Gap 05/03/2021 4  3 - 14 mmol/L Final     Glucose 05/03/2021 90  70 - 99 mg/dL Final    Fasting specimen     Urea Nitrogen 05/03/2021 12  7 - 30 mg/dL Final     Creatinine 05/03/2021 0.75  0.52 - 1.04 mg/dL Final     GFR Estimate 05/03/2021 >90  >60 mL/min/[1.73_m2] Final    Comment: Non  GFR Calc  Starting 12/18/2018, serum creatinine based estimated GFR (eGFR) will be   calculated using the Chronic Kidney Disease Epidemiology Collaboration   (CKD-EPI) equation.       GFR Estimate If Black 05/03/2021 >90  >60 mL/min/[1.73_m2] Final    Comment:  GFR Calc  Starting 12/18/2018, serum creatinine based estimated GFR (eGFR) will be   calculated using the Chronic Kidney Disease Epidemiology Collaboration   (CKD-EPI) equation.       Calcium 05/03/2021 9.0  8.5 - 10.1 mg/dL Final     " Bilirubin Total 05/03/2021 0.3  0.2 - 1.3 mg/dL Final     Albumin 05/03/2021 3.3* 3.4 - 5.0 g/dL Final     Protein Total 05/03/2021 7.5  6.8 - 8.8 g/dL Final     Alkaline Phosphatase 05/03/2021 143  40 - 150 U/L Final     ALT 05/03/2021 24  0 - 50 U/L Final     AST 05/03/2021 14  0 - 45 U/L Final     Creatinine Urine 05/03/2021 211  mg/dL Final     Albumin Urine mg/L 05/03/2021 122  mg/L Final     Albumin Urine mg/g Cr 05/03/2021 57.82* 0 - 25 mg/g Cr Final     WBC 05/03/2021 8.9  4.0 - 11.0 10e9/L Final     RBC Count 05/03/2021 4.70  3.8 - 5.2 10e12/L Final     Hemoglobin 05/03/2021 13.4  11.7 - 15.7 g/dL Final     Hematocrit 05/03/2021 40.9  35.0 - 47.0 % Final     MCV 05/03/2021 87  78 - 100 fl Final     MCH 05/03/2021 28.5  26.5 - 33.0 pg Final     MCHC 05/03/2021 32.8  31.5 - 36.5 g/dL Final     RDW 05/03/2021 14.3  10.0 - 15.0 % Final     Platelet Count 05/03/2021 379  150 - 450 10e9/L Final     Cholesterol 05/03/2021 201* <200 mg/dL Final    Desirable:       <200 mg/dl     Triglycerides 05/03/2021 91  <150 mg/dL Final    Fasting specimen     HDL Cholesterol 05/03/2021 55  >49 mg/dL Final     LDL Cholesterol Calculated 05/03/2021 128* <100 mg/dL Final    Comment: Above desirable:  100-129 mg/dl  Borderline High:  130-159 mg/dL  High:             160-189 mg/dL  Very high:       >189 mg/dl       Non HDL Cholesterol 05/03/2021 146* <130 mg/dL Final    Comment: Above Desirable:  130-159 mg/dl  Borderline high:  160-189 mg/dl  High:             190-219 mg/dl  Very high:       >219 mg/dl

## 2021-11-19 DIAGNOSIS — R05.9 COUGH: ICD-10-CM

## 2021-11-19 NOTE — TELEPHONE ENCOUNTER
PCP, could you please approve the transfer of the cough medicine?      I do not have permission to approve.     [facility] [place] is out of the cough medicine. They are asking that we send the RX to the [place] [facility] location.     Bonnie Dill RN  -[facility]

## 2021-11-20 RX ORDER — CODEINE PHOSPHATE AND GUAIFENESIN 10; 100 MG/5ML; MG/5ML
1-2 SOLUTION ORAL
Qty: 118 ML | Refills: 0 | Status: SHIPPED | OUTPATIENT
Start: 2021-11-20 | End: 2022-03-11

## 2022-01-06 ENCOUNTER — VIRTUAL VISIT (OUTPATIENT)
Dept: INTERNAL MEDICINE | Facility: CLINIC | Age: 52
End: 2022-01-06
Payer: COMMERCIAL

## 2022-01-06 DIAGNOSIS — Z20.822 EXPOSURE TO COVID-19 VIRUS: Primary | ICD-10-CM

## 2022-01-06 PROCEDURE — 99213 OFFICE O/P EST LOW 20 MIN: CPT | Mod: TEL | Performed by: INTERNAL MEDICINE

## 2022-01-06 NOTE — PROGRESS NOTES
Cata is a 51 year old who is being evaluated via a billable telephone visit.      What phone number would you like to be contacted at? [phone]  How would you like to obtain your AVS? Elissa Ivy is a 51 year old who presents for the following health issues  accompanied by her self.    HPI     9:42 AM   9:49 AM   Other half tested positive, she needs a negative test by Saturday . She is required to have an appointment and talk to a health care provider in order to get a test for Coronavirus (COVID-19) polymerase chain reaction test scheduled     Concern for COVID-19  About how many days ago did these symptoms start? none  Is this your first visit for this illness? Yes  In the 14 days before your symptoms started, have you had close contact with someone with COVID-19 (Coronavirus)? Yes, I have been in contact with someone who has COVID-19/Coronavirus (confirmed by lab test). Patient feels well at this point without symptomatology buy needs a negative test to get back to work as soon as possible.  Do you have a fever or chills? No  Are you having new or worsening difficulty breathing? No  Do you have new or worsening cough? No  Have you had any new or unexplained body aches? No    Have you experienced any of the following NEW symptoms?    Headache: No    Sore throat: No    Loss of taste or smell: No    Chest pain: No    Diarrhea: No    Rash: No  What treatments have you tried? none  Who do you live with?   Are you, or a household member, a healthcare worker or a ? No  Do you live in a nursing home, group home, or shelter? No  Do you have a way to get food/medications if quarantined? Yes, I have a friend or family member who can help me.              Review of Systems   Constitutional, HEENT, cardiovascular, pulmonary, gi and gu systems are negative, except as otherwise noted.      Objective           Vitals:  No vitals were obtained today due to virtual visit.    Physical  Exam   healthy, alert and no distress  PSYCH: Alert and oriented times 3; coherent speech, normal   rate and volume, able to articulate logical thoughts, able   to abstract reason, no tangential thoughts, no hallucinations   or delusions  Her affect is normal  RESP: No cough, no audible wheezing, able to talk in full sentences  Remainder of exam unable to be completed due to telephone visits          Phone call duration: 7 minutes

## 2022-01-20 DIAGNOSIS — F41.1 GAD (GENERALIZED ANXIETY DISORDER): ICD-10-CM

## 2022-01-20 NOTE — TELEPHONE ENCOUNTER
Routing refill request to provider for review/approval because:  Drug interaction warning  Geovanna Trinidad RN

## 2022-01-21 RX ORDER — CITALOPRAM HYDROBROMIDE 20 MG/1
TABLET ORAL
Qty: 90 TABLET | Refills: 2 | Status: SHIPPED | OUTPATIENT
Start: 2022-01-21 | End: 2022-03-11

## 2022-03-11 ENCOUNTER — OFFICE VISIT (OUTPATIENT)
Dept: FAMILY MEDICINE | Facility: CLINIC | Age: 52
End: 2022-03-11
Payer: COMMERCIAL

## 2022-03-11 VITALS
HEART RATE: 91 BPM | BODY MASS INDEX: 48.82 KG/M2 | RESPIRATION RATE: 18 BRPM | TEMPERATURE: 98.6 F | WEIGHT: 293 LBS | OXYGEN SATURATION: 97 % | DIASTOLIC BLOOD PRESSURE: 82 MMHG | HEIGHT: 65 IN | SYSTOLIC BLOOD PRESSURE: 136 MMHG

## 2022-03-11 DIAGNOSIS — I10 BENIGN HYPERTENSION: ICD-10-CM

## 2022-03-11 DIAGNOSIS — Z12.11 SCREEN FOR COLON CANCER: ICD-10-CM

## 2022-03-11 DIAGNOSIS — K21.9 GASTROESOPHAGEAL REFLUX DISEASE WITHOUT ESOPHAGITIS: ICD-10-CM

## 2022-03-11 DIAGNOSIS — Z13.0 SCREENING FOR DEFICIENCY ANEMIA: ICD-10-CM

## 2022-03-11 DIAGNOSIS — Z79.899 MEDICATION MANAGEMENT: ICD-10-CM

## 2022-03-11 DIAGNOSIS — E78.5 HYPERLIPIDEMIA, UNSPECIFIED HYPERLIPIDEMIA TYPE: ICD-10-CM

## 2022-03-11 DIAGNOSIS — H26.9 CATARACT OF BOTH EYES, UNSPECIFIED CATARACT TYPE: ICD-10-CM

## 2022-03-11 DIAGNOSIS — Z23 NEED FOR VACCINATION: ICD-10-CM

## 2022-03-11 DIAGNOSIS — E66.01 MORBID OBESITY (H): ICD-10-CM

## 2022-03-11 DIAGNOSIS — F32.1 MODERATE MAJOR DEPRESSION (H): ICD-10-CM

## 2022-03-11 DIAGNOSIS — Z12.4 CERVICAL CANCER SCREENING: ICD-10-CM

## 2022-03-11 DIAGNOSIS — Z13.29 SCREENING FOR THYROID DISORDER: ICD-10-CM

## 2022-03-11 DIAGNOSIS — Z11.59 NEED FOR HEPATITIS C SCREENING TEST: ICD-10-CM

## 2022-03-11 DIAGNOSIS — Z11.4 SCREENING FOR HIV (HUMAN IMMUNODEFICIENCY VIRUS): ICD-10-CM

## 2022-03-11 DIAGNOSIS — Z01.818 PREOP GENERAL PHYSICAL EXAM: Primary | ICD-10-CM

## 2022-03-11 DIAGNOSIS — F41.1 GAD (GENERALIZED ANXIETY DISORDER): ICD-10-CM

## 2022-03-11 LAB
ALBUMIN SERPL-MCNC: 3 G/DL (ref 3.4–5)
ALP SERPL-CCNC: 130 U/L (ref 40–150)
ALT SERPL W P-5'-P-CCNC: 24 U/L (ref 0–50)
ANION GAP SERPL CALCULATED.3IONS-SCNC: 8 MMOL/L (ref 3–14)
AST SERPL W P-5'-P-CCNC: 11 U/L (ref 0–45)
BILIRUB SERPL-MCNC: 0.2 MG/DL (ref 0.2–1.3)
BUN SERPL-MCNC: 15 MG/DL (ref 7–30)
CALCIUM SERPL-MCNC: 9.5 MG/DL (ref 8.5–10.1)
CHLORIDE BLD-SCNC: 110 MMOL/L (ref 94–109)
CHOLEST SERPL-MCNC: 194 MG/DL
CO2 SERPL-SCNC: 23 MMOL/L (ref 20–32)
CREAT SERPL-MCNC: 0.79 MG/DL (ref 0.52–1.04)
ERYTHROCYTE [DISTWIDTH] IN BLOOD BY AUTOMATED COUNT: 14.4 % (ref 10–15)
FASTING STATUS PATIENT QL REPORTED: YES
GFR SERPL CREATININE-BSD FRML MDRD: 89 ML/MIN/1.73M2
GLUCOSE BLD-MCNC: 106 MG/DL (ref 70–99)
HCT VFR BLD AUTO: 39.9 % (ref 35–47)
HCV AB SERPL QL IA: NONREACTIVE
HDLC SERPL-MCNC: 54 MG/DL
HGB BLD-MCNC: 12.5 G/DL (ref 11.7–15.7)
HIV 1+2 AB+HIV1 P24 AG SERPL QL IA: NONREACTIVE
LDLC SERPL CALC-MCNC: 125 MG/DL
MCH RBC QN AUTO: 28 PG (ref 26.5–33)
MCHC RBC AUTO-ENTMCNC: 31.3 G/DL (ref 31.5–36.5)
MCV RBC AUTO: 90 FL (ref 78–100)
NONHDLC SERPL-MCNC: 140 MG/DL
PLATELET # BLD AUTO: 353 10E3/UL (ref 150–450)
POTASSIUM BLD-SCNC: 4.3 MMOL/L (ref 3.4–5.3)
PROT SERPL-MCNC: 7.5 G/DL (ref 6.8–8.8)
RBC # BLD AUTO: 4.46 10E6/UL (ref 3.8–5.2)
SODIUM SERPL-SCNC: 141 MMOL/L (ref 133–144)
TRIGL SERPL-MCNC: 76 MG/DL
TSH SERPL DL<=0.005 MIU/L-ACNC: 1.81 MU/L (ref 0.4–4)
WBC # BLD AUTO: 9.7 10E3/UL (ref 4–11)

## 2022-03-11 PROCEDURE — 86803 HEPATITIS C AB TEST: CPT | Performed by: INTERNAL MEDICINE

## 2022-03-11 PROCEDURE — 36415 COLL VENOUS BLD VENIPUNCTURE: CPT | Performed by: INTERNAL MEDICINE

## 2022-03-11 PROCEDURE — 80053 COMPREHEN METABOLIC PANEL: CPT | Performed by: INTERNAL MEDICINE

## 2022-03-11 PROCEDURE — 84443 ASSAY THYROID STIM HORMONE: CPT | Performed by: INTERNAL MEDICINE

## 2022-03-11 PROCEDURE — 90471 IMMUNIZATION ADMIN: CPT | Performed by: INTERNAL MEDICINE

## 2022-03-11 PROCEDURE — 80061 LIPID PANEL: CPT | Performed by: INTERNAL MEDICINE

## 2022-03-11 PROCEDURE — 90715 TDAP VACCINE 7 YRS/> IM: CPT | Performed by: INTERNAL MEDICINE

## 2022-03-11 PROCEDURE — 99215 OFFICE O/P EST HI 40 MIN: CPT | Mod: 25 | Performed by: INTERNAL MEDICINE

## 2022-03-11 PROCEDURE — 85027 COMPLETE CBC AUTOMATED: CPT | Performed by: INTERNAL MEDICINE

## 2022-03-11 PROCEDURE — 82728 ASSAY OF FERRITIN: CPT | Performed by: INTERNAL MEDICINE

## 2022-03-11 PROCEDURE — 87389 HIV-1 AG W/HIV-1&-2 AB AG IA: CPT | Performed by: INTERNAL MEDICINE

## 2022-03-11 RX ORDER — CITALOPRAM HYDROBROMIDE 20 MG/1
20 TABLET ORAL DAILY
Qty: 90 TABLET | Refills: 3 | Status: SHIPPED | OUTPATIENT
Start: 2022-03-11 | End: 2023-03-29

## 2022-03-11 RX ORDER — LISINOPRIL 20 MG/1
20 TABLET ORAL DAILY
Qty: 90 TABLET | Refills: 3 | Status: SHIPPED | OUTPATIENT
Start: 2022-03-11 | End: 2023-03-29

## 2022-03-11 ASSESSMENT — PAIN SCALES - GENERAL: PAINLEVEL: NO PAIN (0)

## 2022-03-11 NOTE — PATIENT INSTRUCTIONS
You are due for mammogram.  Please call the following number to make appointment :  [phone]  It is located in [address]   There is this is a new shingles vaccine available called shingrex  It is a series of 2 shots 2-6 months apart.  Considered more than 90% effective.  Please go to any pharmacy to get the  vaccine  You are due for physical and pap test   Make appointment for physical  Seek sooner medical attention if there is any worsening of symptoms or problems.

## 2022-03-11 NOTE — PROGRESS NOTES
[address], [address]  [facility] [address]  Phone: [phone]  Primary Provider: Jhonny Pelaez  Pre-op Performing Provider: MICHELLE BRADSHAW      PREOPERATIVE EVALUATION:  Today's date: 3/11/2022    Cata Etienne is a 51 year old female who presents for a preoperative evaluation.    Surgical Information:  Surgery/Procedure: cataract removal  Surgery Location: [facility]  Surgeon: Dr. Saavedra  Surgery Date: 03/16/2022 left eye and right eye is on 3/30/22  Time of Surgery: 845 a.m.  Where patient plans to recover: At home with family  Fax number for surgical facility: [phone]    Type of Anesthesia Anticipated: to be determined    Assessment & Plan     The proposed surgical procedure is considered LOW risk.    Cata was seen today for pre-op exam.    Diagnoses and all orders for this visit:    Preop general physical exam  Performed    Cataract of both eyes, unspecified cataract type  She will have  surgery on left side first and then week later on the right side    Moderate major depression (H)  She is on citalopram and is helping depression and anxiety both    ROBERT (generalized anxiety disorder)  -     citalopram (CELEXA) 20 MG tablet; Take 1 tablet (20 mg) by mouth daily  This is working for anxiety    Gastroesophageal reflux disease without esophagitis  -     omeprazole (PRILOSEC) 20 MG DR capsule; Take 1 capsule (20 mg) by mouth daily  Without the medication her symptoms comes back  Patient is advised that PPI's affect calcium absorption so make sure take adequate calcium with vit D     Benign hypertension  -     lisinopril (ZESTRIL) 20 MG tablet; Take 1 tablet (20 mg) by mouth daily for Blood Pressure  Well controlled    Morbid obesity (H)  Discussed healthy diet and exercise  Discussed medication option   she is not interested in medication option for obesity  She thinks she will be able to lose weight on her own    Hyperlipidemia, unspecified hyperlipidemia  type  -     Lipid panel reflex to direct LDL Fasting; Future  Low-cholesterol low-fat diet    Medication management  -     Comprehensive metabolic panel; Future    Screening for HIV (human immunodeficiency virus)  -     HIV Antigen Antibody Combo; Future    Need for hepatitis C screening test  -     Hepatitis C Screen Reflex to HCV RNA Quant and Genotype; Future    Cervical cancer screening  Overdue for Pap smear  She does not even remember who did it previously  Advised to schedule complete physical with Pap smear    Screening for thyroid disorder  -     TSH with free T4 reflex; Future    Screen for colon cancer  -     Fecal colorectal cancer screen (FIT); Future    Screening for deficiency anemia  -     CBC with platelets; Future    Other orders  -     REVIEW OF HEALTH MAINTENANCE PROTOCOL ORDERS  -     DEPRESSION ACTION PLAN (DAP)    Preventive health counseling was also done.  Reminded that she is due for physical and Pap and pelvic exam as well as mammogram  Discussed the shingles vaccine         Risks and Recommendations:  The patient has the following additional risks and recommendations for perioperative complications:   - No identified additional risk factors other than previously addressed    Medication Instructions:  Patient is to take all scheduled medications on the day of surgery    RECOMMENDATION:  APPROVAL GIVEN to proceed with proposed procedure, without further diagnostic evaluation.    Patient is optimal for above procedure.          Subjective     HPI related to upcoming procedure:   She has bilateral cataract.  Affecting vision  Difficulty driving at night      Preop Questions 3/11/2022   1. Have you ever had a heart attack or stroke? No   2. Have you ever had surgery on your heart or blood vessels, such as a stent placement, a coronary artery bypass, or surgery on an artery in your head, neck, heart, or legs? No   3. Do you have chest pain with activity? No   4. Do you have a history of  heart  failure? No   5. Do you currently have a cold, bronchitis or symptoms of other infection? No   6. Do you have a cough, shortness of breath, or wheezing? No   7. Do you or anyone in your family have previous history of blood clots? No    8. Do you or does anyone in your family have a serious bleeding problem such as prolonged bleeding following surgeries or cuts? No    9. Have you ever had problems with anemia or been told to take iron pills? No   10. Have you had any abnormal blood loss such as black, tarry or bloody stools, or abnormal vaginal bleeding? No   11. Have you ever had a blood transfusion? No   12. Are you willing to have a blood transfusion if it is medically needed before, during, or after your surgery? Yes   13. Have you or any of your relatives ever had problems with anesthesia? NO   14. Do you have sleep apnea, excessive snoring or daytime drowsiness? No   15. Do you have any artifical heart valves or other implanted medical devices like a pacemaker, defibrillator, or continuous glucose monitor? No   16. Do you have artificial joints? No   17. Are you allergic to latex? No   18. Is there any chance that you may be pregnant? No     Health Care Directive:  Patient does not have a Health Care Directive or Living Will: Discussed advance care planning with patient; information given to patient to review.    Preoperative Review of :   reviewed - only cough syrup with codein      Status of Chronic Conditions:  See problem list for active medical problems.  Problems all longstanding and stable, except as noted/documented.  See ROS for pertinent symptoms related to these conditions.      Review of Systems  Constitutional, neuro, ENT, endocrine, pulmonary, cardiac, gastrointestinal, genitourinary, musculoskeletal, integument and psychiatric systems are negative, except as otherwise noted.    Patient Active Problem List    Diagnosis Date Noted     Moderate major depression (H) 03/11/2022     Priority:  "Medium     Morbid obesity (H) 08/20/2019     Priority: Medium     CARDIOVASCULAR SCREENING; LDL GOAL LESS THAN 160 09/09/2011     Priority: Medium     Hypertension 09/09/2011     Priority: Medium     GERD (gastroesophageal reflux disease) 10/14/2010     Priority: Medium     Obesity 02/24/2009     Priority: Medium      Past Medical History:   Diagnosis Date     HTN (hypertension)      Obesity      History reviewed. No pertinent surgical history.  Current Outpatient Medications   Medication Sig Dispense Refill     albuterol (PROAIR HFA/PROVENTIL HFA/VENTOLIN HFA) 108 (90 Base) MCG/ACT inhaler Inhale 2 puffs into the lungs every 4 hours as needed for shortness of breath / dyspnea or wheezing (cough) 18 g 0     citalopram (CELEXA) 20 MG tablet Take 1 tablet (20 mg) by mouth daily 90 tablet 3     lisinopril (ZESTRIL) 20 MG tablet Take 1 tablet (20 mg) by mouth daily for Blood Pressure 90 tablet 3     omeprazole (PRILOSEC) 20 MG DR capsule Take 1 capsule (20 mg) by mouth daily 90 capsule 3       No Known Allergies     Social History     Tobacco Use     Smoking status: Never Smoker     Smokeless tobacco: Never Used   Substance Use Topics     Alcohol use: Yes     Comment: few drinks per month       History   Drug Use No       No history of any anesthesia complications   No family history of any anesthesia complications.    Objective     /82 (BP Location: Right arm, Cuff Size: Adult Large)   Pulse 91   Temp 98.6  F (37  C) (Tympanic)   Resp 18   Ht 1.651 m (5' 5\")   Wt (!) 157.9 kg (348 lb)   LMP 08/07/2017 (Approximate)   SpO2 97%   BMI 57.91 kg/m      Physical Exam    GENERAL APPEARANCE: healthy, alert and no distress     EYES: EOMI, PERRL     HENT: ear canals and TM's normal and nose and mouth without ulcers or lesions     NECK: no adenopathy, no asymmetry, masses, or scars and thyroid normal to palpation     RESP: lungs clear to auscultation - no rales, rhonchi or wheezes     CV: regular rates and rhythm, " normal S1 S2, no S3 or S4 and no murmur, click or rub     ABDOMEN:  soft, nontender, no HSM or masses and bowel sounds normal     MS: extremities normal- no gross deformities noted, no evidence of inflammation in joints, FROM in all extremities.     SKIN: no suspicious lesions or rashes     NEURO: Normal strength and tone, sensory exam grossly normal, mentation intact and speech normal     PSYCH: mentation appears normal. and affect normal/bright     LYMPHATICS: No cervical adenopathy  She has a skin tags on her neck  Recent Labs   Lab Test 05/03/21  0853 05/04/20  1502   HGB 13.4 13.6    343     --    POTASSIUM 4.0  --    CR 0.75  --         Diagnostics:  Recent Results (from the past 24 hour(s))   HIV Antigen Antibody Combo    Collection Time: 03/11/22  8:28 AM   Result Value Ref Range    HIV Antigen Antibody Combo Nonreactive Nonreactive   Hepatitis C Screen Reflex to HCV RNA Quant and Genotype    Collection Time: 03/11/22  8:28 AM   Result Value Ref Range    Hepatitis C Antibody Nonreactive Nonreactive   Comprehensive metabolic panel    Collection Time: 03/11/22  8:28 AM   Result Value Ref Range    Sodium 141 133 - 144 mmol/L    Potassium 4.3 3.4 - 5.3 mmol/L    Chloride 110 (H) 94 - 109 mmol/L    Carbon Dioxide (CO2) 23 20 - 32 mmol/L    Anion Gap 8 3 - 14 mmol/L    Urea Nitrogen 15 7 - 30 mg/dL    Creatinine 0.79 0.52 - 1.04 mg/dL    Calcium 9.5 8.5 - 10.1 mg/dL    Glucose 106 (H) 70 - 99 mg/dL    Alkaline Phosphatase 130 40 - 150 U/L    AST 11 0 - 45 U/L    ALT 24 0 - 50 U/L    Protein Total 7.5 6.8 - 8.8 g/dL    Albumin 3.0 (L) 3.4 - 5.0 g/dL    Bilirubin Total 0.2 0.2 - 1.3 mg/dL    GFR Estimate 89 >60 mL/min/1.73m2   TSH with free T4 reflex    Collection Time: 03/11/22  8:28 AM   Result Value Ref Range    TSH 1.81 0.40 - 4.00 mU/L   CBC with platelets    Collection Time: 03/11/22  8:28 AM   Result Value Ref Range    WBC Count 9.7 4.0 - 11.0 10e3/uL    RBC Count 4.46 3.80 - 5.20 10e6/uL     Hemoglobin 12.5 11.7 - 15.7 g/dL    Hematocrit 39.9 35.0 - 47.0 %    MCV 90 78 - 100 fL    MCH 28.0 26.5 - 33.0 pg    MCHC 31.3 (L) 31.5 - 36.5 g/dL    RDW 14.4 10.0 - 15.0 %    Platelet Count 353 150 - 450 10e3/uL        No EKG required for low risk surgery (cataract, skin procedure, breast biopsy, etc).    Revised Cardiac Risk Index (RCRI):  The patient has the following serious cardiovascular risks for perioperative complications:   - No serious cardiac risks = 0 points     RCRI Interpretation: 0 points: Class I (very low risk - 0.4% complication rate)      40 minutes spent on the date of the encounter doing chart review, history and exam, documentation and further activities as noted above    Disclaimer: This note consists of symbols derived from keyboarding, dictation and/or voice recognition software. As a result, there may be errors in the script that have gone undetected. Please consider this when interpreting information found in this chart.         Signed Electronically by: Vy Dykes MD  Copy of this evaluation report is provided to requesting physician.

## 2022-03-11 NOTE — RESULT ENCOUNTER NOTE
Regi Ivy,    This is to inform you regarding your test result.    CBC result which includes white count Hemoglobin and  Platelet Counts is normal.   Other test results are pending.          Sincerely,      Dr.Nasima Jania MD,FACP

## 2022-03-11 NOTE — NURSING NOTE
Prior to immunization administration, verified patients identity using patient s name and date of birth. Please see Immunization Activity for additional information.     Screening Questionnaire for Adult Immunization    Are you sick today?   No   Do you have allergies to medications, food, a vaccine component or latex?   No   Have you ever had a serious reaction after receiving a vaccination?   No   Do you have a long-term health problem with heart, lung, kidney, or metabolic disease (e.g., diabetes), asthma, a blood disorder, no spleen, complement component deficiency, a cochlear implant, or a spinal fluid leak?  Are you on long-term aspirin therapy?   No   Do you have cancer, leukemia, HIV/AIDS, or any other immune system problem?   No   Do you have a parent, brother, or sister with an immune system problem?   No   In the past 3 months, have you taken medications that affect  your immune system, such as prednisone, other steroids, or anticancer drugs; drugs for the treatment of rheumatoid arthritis, Crohn s disease, or psoriasis; or have you had radiation treatments?   No   Have you had a seizure, or a brain or other nervous system problem?   No   During the past year, have you received a transfusion of blood or blood    products, or been given immune (gamma) globulin or antiviral drug?   No   For women: Are you pregnant or is there a chance you could become       pregnant during the next month?   No   Have you received any vaccinations in the past 4 weeks?   No     Immunization questionnaire answers were all negative.        Per orders of Dr. Dykes, injection of Tdap given by Aylin Whitaker CMA. Patient instructed to remain in clinic for 15 minutes afterwards, and to report any adverse reaction to me immediately.       Screening performed by Aylin Whitaker CMA on 3/11/2022 at 8:30 AM.

## 2022-03-14 LAB — FERRITIN SERPL-MCNC: 17 NG/ML (ref 8–252)

## 2022-03-14 NOTE — RESULT ENCOUNTER NOTE
Regi Ivy,    This is to inform you regarding your test result.    Your total cholesterol is normal.  HDL which is called good cholesterol is normal.  Your LDL cholesterol is elevated   Your triglycerides are normal.  Eat low cholesterol low fat  diet and do regular physical activity.  HIV 1&2 Antibody is negative.  TSH which is thyroid hormone is normal.  Liver and kidney function is fine  Protein level is low  CBC result which includes Hemoglobin and  Platelet Counts is satisfactory.        Sincerely,      Dr.Nasima Jania MD,FACP

## 2022-03-15 NOTE — RESULT ENCOUNTER NOTE
Regi Ivy,    This is to inform you regarding your test result.    Ferritin which is iron stores in the body is low.  We want Ferritin level greater than   Take OTC Ferrous Sulfate 325 mg po once daily or every other day if you tolerate.  Take with vit C as vit C helps to absorb iron.  Iron can make you constipated so take stool softener.  Recheck ferritin in 4 months  Please make appointment with  to discuss work up for iron deficiency.        Sincerely,      Dr.Nasima Jania MD,FACP

## 2022-06-01 ENCOUNTER — OFFICE VISIT (OUTPATIENT)
Dept: FAMILY MEDICINE | Facility: CLINIC | Age: 52
End: 2022-06-01
Payer: COMMERCIAL

## 2022-06-01 VITALS
WEIGHT: 293 LBS | RESPIRATION RATE: 16 BRPM | HEIGHT: 65 IN | DIASTOLIC BLOOD PRESSURE: 77 MMHG | TEMPERATURE: 97.3 F | OXYGEN SATURATION: 99 % | SYSTOLIC BLOOD PRESSURE: 126 MMHG | BODY MASS INDEX: 48.82 KG/M2 | HEART RATE: 91 BPM

## 2022-06-01 DIAGNOSIS — J06.9 VIRAL URI WITH COUGH: Primary | ICD-10-CM

## 2022-06-01 PROCEDURE — 99213 OFFICE O/P EST LOW 20 MIN: CPT | Performed by: NURSE PRACTITIONER

## 2022-06-01 ASSESSMENT — PATIENT HEALTH QUESTIONNAIRE - PHQ9
10. IF YOU CHECKED OFF ANY PROBLEMS, HOW DIFFICULT HAVE THESE PROBLEMS MADE IT FOR YOU TO DO YOUR WORK, TAKE CARE OF THINGS AT HOME, OR GET ALONG WITH OTHER PEOPLE: NOT DIFFICULT AT ALL
SUM OF ALL RESPONSES TO PHQ QUESTIONS 1-9: 1
SUM OF ALL RESPONSES TO PHQ QUESTIONS 1-9: 1

## 2022-06-01 NOTE — PROGRESS NOTES
Assessment & Plan   Problem List Items Addressed This Visit    None     Visit Diagnoses     Viral URI with cough    -  Primary         Suspect viral etiology. Will continue to watch and wait. Push fluids. Can take OTC meds prn. Follow-up if worsening      ZHANNA Kirby CNP  [facility] [place] Ivy is a 52 year old who presents for the following health issues   History of Present Illness       Reason for visit:  Deep cough, thinking possibly bronchitis  Symptom onset:  1-3 days ago  Symptom intensity:  Moderate  Symptom progression:  Staying the same  Had these symptoms before:  Yes  Has tried/received treatment for these symptoms:  Yes  Previous treatment was successful:  No  What makes it worse:  Coughing making ribs hurt and getting headaches  What makes it better:  Sleep when I can    She eats 0-1 servings of fruits and vegetables daily.She consumes 0 sweetened beverage(s) daily.She exercises with enough effort to increase her heart rate 10 to 19 minutes per day.  She exercises with enough effort to increase her heart rate 4 days per week.   She is taking medications regularly.    Today's PHQ-9         PHQ-9 Total Score: 1    PHQ-9 Q9 Thoughts of better off dead/self-harm past 2 weeks :   Not at all    How difficult have these problems made it for you to do your work, take care of things at home, or get along with other people: Not difficult at all     Pt reports having a cough for a while, she feels like she has a tickle in throat, cough has worsened since Sunday. Pt now describes cough as hacking, non productive and has bilateral posterior rib pain with coughing. Pt is waking up at night with cough. Headaches due to pressure from coughing. Denies congestion, sore throat and ear pain. No changes to eating habits, has increased fluid intake. Denies SOB.  No fevers    Has tried cough medicine, has helped a little.   Pt reports increased stress in the last month due to  "moving, mother is hospitalized and job change, which she loves.     Review of Systems   Detailed as above         Objective    /77 (BP Location: Right arm, Patient Position: Sitting, Cuff Size: Adult Large)   Pulse 91   Temp 97.3  F (36.3  C)   Resp 16   Ht 1.651 m (5' 5\")   Wt (!) 158.3 kg (349 lb)   LMP 08/07/2017 (Approximate)   SpO2 99%   BMI 58.08 kg/m    There is no height or weight on file to calculate BMI.  Physical Exam  Constitutional:       Appearance: Normal appearance.   HENT:      Head: Normocephalic.      Right Ear: Tympanic membrane, ear canal and external ear normal.      Left Ear: Tympanic membrane, ear canal and external ear normal.      Nose: No mucosal edema.      Comments: Abnormal anatomy of left naris      Mouth/Throat:      Pharynx: No oropharyngeal exudate.   Eyes:      Conjunctiva/sclera: Conjunctivae normal.   Cardiovascular:      Rate and Rhythm: Normal rate and regular rhythm.      Heart sounds: Normal heart sounds. No murmur heard.  Pulmonary:      Effort: Pulmonary effort is normal. No respiratory distress.      Breath sounds: Normal breath sounds.   Musculoskeletal:      Cervical back: Normal range of motion.   Lymphadenopathy:      Cervical: No cervical adenopathy.   Skin:     General: Skin is warm and dry.   Neurological:      Mental Status: She is alert.   Psychiatric:         Mood and Affect: Mood normal.                "

## 2022-06-06 ENCOUNTER — TELEPHONE (OUTPATIENT)
Dept: FAMILY MEDICINE | Facility: CLINIC | Age: 52
End: 2022-06-06
Payer: COMMERCIAL

## 2022-06-06 DIAGNOSIS — R05.9 COUGH: Primary | ICD-10-CM

## 2022-06-06 RX ORDER — PREDNISONE 20 MG/1
20 TABLET ORAL 2 TIMES DAILY
Qty: 8 TABLET | Refills: 0 | Status: SHIPPED | OUTPATIENT
Start: 2022-06-06 | End: 2023-03-29

## 2022-06-06 NOTE — TELEPHONE ENCOUNTER
Patient calling to report symptoms are the same, patient is not able to return to work yet.     Patient requesting possible inhaler or antibiotics.      Patient has sore throat, congestion, dry cough.  Patient states cough syrup has not helped. Patient is on 2nd bottle of Robitussin.           Can we leave a detailed message on this number? YES  Phone number patient can be reached at: Cell number on file:    Telephone Information:   Mobile [phone]       Alona Kang RN  [facility] Triage

## 2022-06-06 NOTE — TELEPHONE ENCOUNTER
Has she self tested for covid recently?  Will likely need to be seen for re eval.  May schedule as video  Jt Frank MD on 6/6/2022 at 3:18 PM

## 2022-06-06 NOTE — TELEPHONE ENCOUNTER
Routing to PCP as Tiffany is out  Dr. Pedraza, to respond to below questions - pt did test for covid 1 hour ago and was negative, and did test last week as well.    Reviewed Kalin notes which suggested most likely viral etiology, but to call back if sx not improved.    Since sx not improved, pt requesting something to help cough improve faster so she can go back to work - ie abx or inhaler.  However no new or worsening sx.    Did advise a follow up visit, but pt questioning this as was advised to call back for follow up, and nothing was sent/ordered at previous visit.    Did pend pharmacy if anything appropriate?  Lauren Schreiber, RN  [facility] RN Triage Team

## 2022-06-06 NOTE — TELEPHONE ENCOUNTER
Fever?  Will send antibiotic if so.    Otherwise will just send prednisone.    Favian Pedraza MD on 6/6/2022 at 6:26 PM

## 2022-06-07 NOTE — TELEPHONE ENCOUNTER
Patient Contact     Attempt #: 1     Was call answered?  No.  Left message on voicemail with information to call triage RN    On callback please clarify if patient is having fever,   If not please notify patient that prednisone script sent to preferred pharmacy.      Ashutosh Ornelas RN  [facility]

## 2022-06-08 NOTE — TELEPHONE ENCOUNTER
Feeling better today and no fever    Pt was aware of prednisone sent in     Continues to improve     Michela TOBIAS, Triage RN  [facility]

## 2023-03-29 ENCOUNTER — OFFICE VISIT (OUTPATIENT)
Dept: FAMILY MEDICINE | Facility: CLINIC | Age: 53
End: 2023-03-29
Payer: COMMERCIAL

## 2023-03-29 VITALS
TEMPERATURE: 97.7 F | HEIGHT: 65 IN | OXYGEN SATURATION: 97 % | DIASTOLIC BLOOD PRESSURE: 73 MMHG | BODY MASS INDEX: 48.82 KG/M2 | RESPIRATION RATE: 16 BRPM | WEIGHT: 293 LBS | SYSTOLIC BLOOD PRESSURE: 133 MMHG | HEART RATE: 91 BPM

## 2023-03-29 DIAGNOSIS — I10 BENIGN HYPERTENSION: Primary | ICD-10-CM

## 2023-03-29 DIAGNOSIS — K21.9 GASTROESOPHAGEAL REFLUX DISEASE WITHOUT ESOPHAGITIS: ICD-10-CM

## 2023-03-29 DIAGNOSIS — F41.1 GAD (GENERALIZED ANXIETY DISORDER): ICD-10-CM

## 2023-03-29 DIAGNOSIS — J45.909 MILD REACTIVE AIRWAYS DISEASE, UNSPECIFIED WHETHER PERSISTENT: ICD-10-CM

## 2023-03-29 LAB
ALBUMIN SERPL BCG-MCNC: 3.9 G/DL (ref 3.5–5.2)
ALP SERPL-CCNC: 125 U/L (ref 35–104)
ALT SERPL W P-5'-P-CCNC: 18 U/L (ref 10–35)
ANION GAP SERPL CALCULATED.3IONS-SCNC: 11 MMOL/L (ref 7–15)
AST SERPL W P-5'-P-CCNC: 20 U/L (ref 10–35)
BASOPHILS # BLD AUTO: 0 10E3/UL (ref 0–0.2)
BASOPHILS NFR BLD AUTO: 0 %
BILIRUB SERPL-MCNC: 0.2 MG/DL
BUN SERPL-MCNC: 14.7 MG/DL (ref 6–20)
CALCIUM SERPL-MCNC: 9.5 MG/DL (ref 8.6–10)
CHLORIDE SERPL-SCNC: 101 MMOL/L (ref 98–107)
CREAT SERPL-MCNC: 0.72 MG/DL (ref 0.51–0.95)
DEPRECATED HCO3 PLAS-SCNC: 27 MMOL/L (ref 22–29)
EOSINOPHIL # BLD AUTO: 0.2 10E3/UL (ref 0–0.7)
EOSINOPHIL NFR BLD AUTO: 2 %
ERYTHROCYTE [DISTWIDTH] IN BLOOD BY AUTOMATED COUNT: 14.2 % (ref 10–15)
GFR SERPL CREATININE-BSD FRML MDRD: >90 ML/MIN/1.73M2
GLUCOSE SERPL-MCNC: 105 MG/DL (ref 70–99)
HCT VFR BLD AUTO: 38.7 % (ref 35–47)
HGB BLD-MCNC: 11.9 G/DL (ref 11.7–15.7)
IMM GRANULOCYTES # BLD: 0.1 10E3/UL
IMM GRANULOCYTES NFR BLD: 1 %
LYMPHOCYTES # BLD AUTO: 2.1 10E3/UL (ref 0.8–5.3)
LYMPHOCYTES NFR BLD AUTO: 18 %
MCH RBC QN AUTO: 26.7 PG (ref 26.5–33)
MCHC RBC AUTO-ENTMCNC: 30.7 G/DL (ref 31.5–36.5)
MCV RBC AUTO: 87 FL (ref 78–100)
MONOCYTES # BLD AUTO: 0.6 10E3/UL (ref 0–1.3)
MONOCYTES NFR BLD AUTO: 5 %
NEUTROPHILS # BLD AUTO: 9.1 10E3/UL (ref 1.6–8.3)
NEUTROPHILS NFR BLD AUTO: 75 %
PLATELET # BLD AUTO: 369 10E3/UL (ref 150–450)
POTASSIUM SERPL-SCNC: 5 MMOL/L (ref 3.4–5.3)
PROT SERPL-MCNC: 7.4 G/DL (ref 6.4–8.3)
RBC # BLD AUTO: 4.45 10E6/UL (ref 3.8–5.2)
SODIUM SERPL-SCNC: 139 MMOL/L (ref 136–145)
WBC # BLD AUTO: 12.1 10E3/UL (ref 4–11)

## 2023-03-29 PROCEDURE — 80053 COMPREHEN METABOLIC PANEL: CPT | Performed by: PHYSICIAN ASSISTANT

## 2023-03-29 PROCEDURE — 36415 COLL VENOUS BLD VENIPUNCTURE: CPT | Performed by: PHYSICIAN ASSISTANT

## 2023-03-29 PROCEDURE — 85025 COMPLETE CBC W/AUTO DIFF WBC: CPT | Performed by: PHYSICIAN ASSISTANT

## 2023-03-29 PROCEDURE — 99214 OFFICE O/P EST MOD 30 MIN: CPT | Performed by: PHYSICIAN ASSISTANT

## 2023-03-29 RX ORDER — CITALOPRAM HYDROBROMIDE 40 MG/1
40 TABLET ORAL DAILY
Qty: 30 TABLET | Refills: 2 | Status: SHIPPED | OUTPATIENT
Start: 2023-03-29 | End: 2023-05-30

## 2023-03-29 RX ORDER — CITALOPRAM HYDROBROMIDE 20 MG/1
20 TABLET ORAL DAILY
Qty: 90 TABLET | Refills: 3 | Status: CANCELLED | OUTPATIENT
Start: 2023-03-29

## 2023-03-29 RX ORDER — LISINOPRIL 20 MG/1
20 TABLET ORAL DAILY
Qty: 90 TABLET | Refills: 3 | Status: SHIPPED | OUTPATIENT
Start: 2023-03-29 | End: 2024-04-18

## 2023-03-29 RX ORDER — ALBUTEROL SULFATE 90 UG/1
2 AEROSOL, METERED RESPIRATORY (INHALATION) EVERY 6 HOURS PRN
Qty: 18 G | Refills: 1 | Status: SHIPPED | OUTPATIENT
Start: 2023-03-29 | End: 2023-05-30

## 2023-03-29 ASSESSMENT — ANXIETY QUESTIONNAIRES
8. IF YOU CHECKED OFF ANY PROBLEMS, HOW DIFFICULT HAVE THESE MADE IT FOR YOU TO DO YOUR WORK, TAKE CARE OF THINGS AT HOME, OR GET ALONG WITH OTHER PEOPLE?: SOMEWHAT DIFFICULT
7. FEELING AFRAID AS IF SOMETHING AWFUL MIGHT HAPPEN: SEVERAL DAYS
GAD7 TOTAL SCORE: 7
2. NOT BEING ABLE TO STOP OR CONTROL WORRYING: SEVERAL DAYS
4. TROUBLE RELAXING: SEVERAL DAYS
5. BEING SO RESTLESS THAT IT IS HARD TO SIT STILL: SEVERAL DAYS
3. WORRYING TOO MUCH ABOUT DIFFERENT THINGS: SEVERAL DAYS
1. FEELING NERVOUS, ANXIOUS, OR ON EDGE: MORE THAN HALF THE DAYS
IF YOU CHECKED OFF ANY PROBLEMS ON THIS QUESTIONNAIRE, HOW DIFFICULT HAVE THESE PROBLEMS MADE IT FOR YOU TO DO YOUR WORK, TAKE CARE OF THINGS AT HOME, OR GET ALONG WITH OTHER PEOPLE: SOMEWHAT DIFFICULT
6. BECOMING EASILY ANNOYED OR IRRITABLE: NOT AT ALL
7. FEELING AFRAID AS IF SOMETHING AWFUL MIGHT HAPPEN: SEVERAL DAYS

## 2023-03-29 ASSESSMENT — PATIENT HEALTH QUESTIONNAIRE - PHQ9
SUM OF ALL RESPONSES TO PHQ QUESTIONS 1-9: 10
SUM OF ALL RESPONSES TO PHQ QUESTIONS 1-9: 10
10. IF YOU CHECKED OFF ANY PROBLEMS, HOW DIFFICULT HAVE THESE PROBLEMS MADE IT FOR YOU TO DO YOUR WORK, TAKE CARE OF THINGS AT HOME, OR GET ALONG WITH OTHER PEOPLE: SOMEWHAT DIFFICULT

## 2023-03-29 ASSESSMENT — PAIN SCALES - GENERAL: PAINLEVEL: NO PAIN (0)

## 2023-03-29 NOTE — PROGRESS NOTES
"  Assessment & Plan     Benign hypertension  Continue Lisinopril 20 mg daily. Labs today.   - lisinopril (ZESTRIL) 20 MG tablet  Dispense: 90 tablet; Refill: 3  - CBC with platelets and differential  - Comprehensive metabolic panel (BMP + Alb, Alk Phos, ALT, AST, Total. Bili, TP)  - CBC with platelets and differential  - Comprehensive metabolic panel (BMP + Alb, Alk Phos, ALT, AST, Total. Bili, TP)    ROBERT (generalized anxiety disorder)  Increase Celexa to 40 mg. Discussed side effects. Therapy referral signed. 2 month follow-up. Sooner if needed.   - citalopram (CELEXA) 40 MG tablet  Dispense: 30 tablet; Refill: 2  - Adult Mental Health  Referral    Mild reactive airways disease, unspecified whether persistent  Reasonable to try albuterol PRN for breathing. Close follow-up with worsening symptoms.   - albuterol (PROAIR HFA/PROVENTIL HFA/VENTOLIN HFA) 108 (90 Base) MCG/ACT inhaler  Dispense: 18 g; Refill: 1    Gastroesophageal reflux disease without esophagitis  Increase Omeprazole to 40 mg daily OTC. Option to prescribe H2 blocker in the future. Discuss GI at next visit. Endo + colon.       30 minutes spent by me on the date of the encounter doing chart review, review of test results, interpretation of tests, patient visit and documentation        BMI:   Estimated body mass index is 63.57 kg/m  as calculated from the following:    Height as of this encounter: 1.651 m (5' 5\").    Weight as of this encounter: 173.3 kg (382 lb).   Weight management plan: Discussed healthy diet and exercise guidelines    The likelihood of other entities in the differential is insufficient to justify any further testing for them at this time. This was explained to the patient. The patient was advised that persistent or worsening symptoms would require further evaluation. Patient advised to call the office and if unable to reach to go to the emergency room if they develop any new or worsening symptoms. Expressed understanding and " agreement with above stated plan.       Kasi Dorsey PA-C  [facility] [place] is a 52 year old, presenting for the following health issues:  Recheck Medication    Increased stressors with mom requiring more care. Increased anxiety and worsening sleep. On Celexa 20 mg daily. History of panic attacks. Wishes to discuss option for grief counseling.     BP stable on 20 mg of Lisinopril. Needs refill    History of GERD mildly controlled with 20 mg of Omeprazole. Insurance won't cover PPIs.     Increased SOB. Unsure if related to stressors, weight gain or something else. Previously did have an inhaler which she used which was helpful.     History of Present Illness       Mental Health Follow-up:  Patient presents to follow-up on Depression & Anxiety.Patient's depression since last visit has been:  Good  The patient is not having other symptoms associated with depression.  Patient's anxiety since last visit has been:  Medium  The patient is having other symptoms associated with anxiety.  Any significant life events: grief or loss and other  Patient is feeling anxious or having panic attacks.  Patient has no concerns about alcohol or drug use.    Hypertension: She presents for follow up of hypertension.  She does not check blood pressure  regularly outside of the clinic. Outpatient blood pressures have not been over 140/90. She does not follow a low salt diet.     Reason for visit:  Rx renewal and follow up on prior Dx issues    She eats 0-1 servings of fruits and vegetables daily.She consumes 0 sweetened beverage(s) daily.She exercises with enough effort to increase her heart rate 20 to 29 minutes per day.  She exercises with enough effort to increase her heart rate 3 or less days per week.   She is taking medications regularly.    Today's PHQ-9         PHQ-9 Total Score: 10    PHQ-9 Q9 Thoughts of better off dead/self-harm past 2 weeks :   Not at all    How difficult have these  "problems made it for you to do your work, take care of things at home, or get along with other people: Somewhat difficult  Today's ROBERT-7 Score: 7       Review of Systems   Constitutional, HEENT, cardiovascular, pulmonary, GI, , musculoskeletal, neuro, skin, endocrine and psych systems are negative, except as otherwise noted.      Objective    /73 (BP Location: Right arm, Patient Position: Sitting, Cuff Size: Adult Large)   Pulse 91   Temp 97.7  F (36.5  C) (Temporal)   Resp 16   Ht 1.651 m (5' 5\")   Wt (!) 173.3 kg (382 lb)   LMP 08/07/2017 (Approximate)   SpO2 97%   BMI 63.57 kg/m    Body mass index is 63.57 kg/m .     Allergies   Allergen Reactions     No Known Allergies      Current Outpatient Medications   Medication Sig Dispense Refill     albuterol (PROAIR HFA/PROVENTIL HFA/VENTOLIN HFA) 108 (90 Base) MCG/ACT inhaler Inhale 2 puffs into the lungs every 6 hours as needed for shortness of breath, wheezing or cough 18 g 1     albuterol (PROAIR HFA/PROVENTIL HFA/VENTOLIN HFA) 108 (90 Base) MCG/ACT inhaler Inhale 2 puffs into the lungs every 4 hours as needed for shortness of breath / dyspnea or wheezing (cough) 18 g 0     citalopram (CELEXA) 40 MG tablet Take 1 tablet (40 mg) by mouth daily 30 tablet 2     lisinopril (ZESTRIL) 20 MG tablet Take 1 tablet (20 mg) by mouth daily for Blood Pressure 90 tablet 3     omeprazole (PRILOSEC) 20 MG DR capsule Take 1 capsule (20 mg) by mouth daily 90 capsule 3     Past Medical History:   Diagnosis Date     HTN (hypertension)      Obesity      No past surgical history on file.      Physical Exam   GENERAL: healthy, alert and no distress  EYES: Eyes grossly normal to inspection, PERRL and conjunctivae and sclerae normal  NECK: no asymmetry, masses, or scars  RESP: lungs clear to auscultation - no rales, rhonchi or wheezes  CV: regular rate and rhythm, normal S1 S2, no S3 or S4, no murmur, click or rub  MS: no gross musculoskeletal defects noted, no edema  SKIN: " no suspicious lesions or rashes  NEURO: Normal strength and tone, mentation intact and speech normal  PSYCH: mentation appears normal, affect normal/bright

## 2023-03-29 NOTE — RESULT ENCOUNTER NOTE
Po Ivy,    It was very nice meeting you today!  Overall labs look stable.  Normal electrolytes and kidney function.  Small elevation in one of your liver function test.  This is actually improved from 1 year ago.  Something we will continue to monitor.  Additionally, slight elevation in your white blood cell count.  No signs of anemia or other concerning parts of your blood counts.  We will check this again in 2 months.    Let me know if you have any questions or concerns,     Kasi Dorsey PA-C  [facility]

## 2023-04-03 ENCOUNTER — NURSE TRIAGE (OUTPATIENT)
Dept: FAMILY MEDICINE | Facility: CLINIC | Age: 53
End: 2023-04-03
Payer: COMMERCIAL

## 2023-04-03 DIAGNOSIS — F41.1 GAD (GENERALIZED ANXIETY DISORDER): Primary | ICD-10-CM

## 2023-04-03 RX ORDER — CITALOPRAM HYDROBROMIDE 10 MG/1
10 TABLET ORAL DAILY
Qty: 30 TABLET | Refills: 2 | Status: SHIPPED | OUTPATIENT
Start: 2023-04-03 | End: 2023-08-22

## 2023-04-03 RX ORDER — CITALOPRAM HYDROBROMIDE 20 MG/1
20 TABLET ORAL DAILY
Qty: 30 TABLET | Refills: 2 | Status: SHIPPED | OUTPATIENT
Start: 2023-04-03 | End: 2023-08-22

## 2023-04-03 NOTE — TELEPHONE ENCOUNTER
Pt called back clinic.     Pt agrees to 30 mg of Celexa.     Please send rx to [id number] - [place], [place] - [address]

## 2023-04-03 NOTE — TELEPHONE ENCOUNTER
Left  voice message asking pt to call triage back. On call back, please inform pt of providers message below.       Kasi Dorsey PA-C  Cs Triage Im 4 hours ago (9:08 AM)     MG  We had discussed 30 mg (20 mg + 10 mg) vs 40 mg and opted for that latter.     Happy to send over additional 10 mg tablets if she wishes to back down to 30 mg which seems like a reasonable option

## 2023-04-03 NOTE — TELEPHONE ENCOUNTER
Nurse Triage SBAR    Is this a 2nd Level Triage? NO    Situation: Patient states that she got a severe, migraine like headache on Saturday after taking the increased dose of Celexa. States that she could not tolerate light and felt nauseated. States that she had to go to bed and sleep.   States that she was surprised that Sunita COLE increase Celexa to 40 mg which was double what she was taking. She thought that she would be going up to 30 mg.  Headache is mild so far this morning without taking Tylenol or ibuprofen.     Background: No history of migraine, but chronic injury since fell 7 years ago.     Assessment: Severe headache due to migraine versus medication change.     Protocol Recommended Disposition:   See in Office Today or Tomorrow    Recommendation:   LOV 3/29. Patient asking if headache could be from the doubled Celexa? Patient is asking if she should continue the Celexa at 40 mg? Please advise plan going forward.     Routed to provider    Does the patient meet one of the following criteria for ADS visit consideration? 16+ years old, with an [facility] PCP     TIP  Providers, please consider if this condition is appropriate for management at one of our Acute and Diagnostic Services sites.     If patient is a good candidate, please use dotphrase <dot>triageresponse and select Refer to ADS to document.      Reason for Disposition    Unexplained headache that is present > 24 hours    Additional Information    Negative: Difficult to awaken or acting confused (e.g., disoriented, slurred speech)    Negative: Weakness of the face, arm or leg on one side of the body and new-onset    Negative: Numbness of the face, arm or leg on one side of the body and new-onset    Negative: Loss of speech or garbled speech and new-onset    Negative: Passed out (i.e., fainted, collapsed and was not responding)    Negative: Sounds like a life-threatening emergency to the triager    Negative: Followed a head injury within last 3  "days    Negative: Traumatic Brain Injury (TBI) is suspected    Negative: Sinus pain of forehead and yellow or green nasal discharge    Negative: Pregnant    Negative: Unable to walk without falling    Negative: Stiff neck (can't touch chin to chest)    Negative: Possibility of carbon monoxide exposure    Negative: SEVERE headache, states 'worst headache' of life    Negative: SEVERE headache, sudden-onset (i.e., reaching maximum intensity within seconds to 1 hour)    Negative: Severe pain in one eye    Negative: Loss of vision or double vision  (Exception: Same as prior migraines.)    Negative: Patient sounds very sick or weak to the triager    Negative: Fever > 103 F (39.4 C)    Negative: Fever > 100.0 F (37.8 C) and has diabetes mellitus or a weak immune system (e.g., HIV positive, cancer chemotherapy, organ transplant, splenectomy, chronic steroids)    Negative: SEVERE headache (e.g., excruciating) and has had severe headaches before    Negative: SEVERE headache and not relieved by pain meds    Negative: SEVERE headache and vomiting    Negative: SEVERE headache and fever    Negative: New headache and weak immune system (e.g., HIV positive, cancer chemo, splenectomy, organ transplant, chronic steroids)    Negative: Fever present > 3 days (72 hours)    Negative: Patient wants to be seen    Answer Assessment - Initial Assessment Questions  1. LOCATION: \"Where does it hurt?\"       Front of head, forehead  2. ONSET: \"When did the headache start?\" (Minutes, hours or days)       Saturday morning after increased dose of Celexa.   3. PATTERN: \"Does the pain come and go, or has it been constant since it started?\"      Slight headache right now. Didn't sleep very well last night.   4. SEVERITY: \"How bad is the pain?\" and \"What does it keep you from doing?\"  (e.g., Scale 1-10; mild, moderate, or severe)    - MILD (1-3): doesn't interfere with normal activities     - MODERATE (4-7): interferes with normal activities or awakens " "from sleep     - SEVERE (8-10): excruciating pain, unable to do any normal activities         Mild today with not Tylenol or ibuprofen. Saturday 8-9/10. Had to go lay down. Felt sensitive to light and was nauseated.  6/10 on Sunday.5. RECURRENT SYMPTOM: \"Have you ever had headaches before?\" If Yes, ask: \"When was the last time?\" and \"What happened that time?\"       Headaches before. Started 7 years ago after falling on the ice and striking her chin on a cement franko tray outside of [place].   6. CAUSE: \"What do you think is causing the headache?\"      Wondering if due to increased dose of Celexa.   7. MIGRAINE: \"Have you been diagnosed with migraine headaches?\" If Yes, ask: \"Is this headache similar?\"       Not diagnosed with migraines. Similar to chronic headaches. Has not had one for awhile.   8. HEAD INJURY: \"Has there been any recent injury to the head?\"       No head injury.   9. OTHER SYMPTOMS: \"Do you have any other symptoms?\" (fever, stiff neck, eye pain, sore throat, cold symptoms)      None  10. PREGNANCY: \"Is there any chance you are pregnant?\" \"When was your last menstrual period?\"        NA, no longer getting periods.    Protocols used: HEADACHE-A-OH    Stefani Trevino RN      "

## 2023-04-03 NOTE — TELEPHONE ENCOUNTER
Patient Contact    Attempt # 1    Was call answered? No.    Left message for patient to call triage back.    Lakshmi Montgomery RN

## 2023-04-06 ENCOUNTER — HOSPITAL ENCOUNTER (EMERGENCY)
Facility: CLINIC | Age: 53
Discharge: HOME | End: 2023-04-06
Attending: EMERGENCY MEDICINE | Admitting: EMERGENCY MEDICINE
Payer: COMMERCIAL

## 2023-04-06 ENCOUNTER — APPOINTMENT (OUTPATIENT)
Dept: GENERAL RADIOLOGY | Facility: CLINIC | Age: 53
End: 2023-04-06
Attending: EMERGENCY MEDICINE
Payer: COMMERCIAL

## 2023-04-06 ENCOUNTER — APPOINTMENT (OUTPATIENT)
Dept: ULTRASOUND IMAGING | Facility: CLINIC | Age: 53
End: 2023-04-06
Attending: EMERGENCY MEDICINE
Payer: COMMERCIAL

## 2023-04-06 VITALS
SYSTOLIC BLOOD PRESSURE: 198 MMHG | BODY MASS INDEX: 48.82 KG/M2 | HEART RATE: 93 BPM | RESPIRATION RATE: 18 BRPM | TEMPERATURE: 98.7 F | DIASTOLIC BLOOD PRESSURE: 77 MMHG | OXYGEN SATURATION: 95 % | HEIGHT: 65 IN | WEIGHT: 293 LBS

## 2023-04-06 DIAGNOSIS — S86.912A MUSCLE STRAIN OF LEFT LOWER LEG, INITIAL ENCOUNTER: ICD-10-CM

## 2023-04-06 PROCEDURE — 93971 EXTREMITY STUDY: CPT | Mod: LT

## 2023-04-06 PROCEDURE — 73590 X-RAY EXAM OF LOWER LEG: CPT | Mod: LT

## 2023-04-06 PROCEDURE — 99284 EMERGENCY DEPT VISIT MOD MDM: CPT | Mod: 25

## 2023-04-06 ASSESSMENT — ENCOUNTER SYMPTOMS
ABDOMINAL PAIN: 0
SHORTNESS OF BREATH: 0
COLOR CHANGE: 0
MYALGIAS: 1

## 2023-04-06 ASSESSMENT — ACTIVITIES OF DAILY LIVING (ADL)
ADLS_ACUITY_SCORE: 35
ADLS_ACUITY_SCORE: 35

## 2023-04-06 NOTE — DISCHARGE INSTRUCTIONS
1. -Take acetaminophen 500 to 1000 mg by mouth every 4 to 6 hours as needed for pain or fever.  Do not take more than 4000 mg in 24 hours.  Do not take within 6 hours of another acetaminophen containing medication such as norco (vicodin) or percocet.  - Take ibuprofen 600 to 800 mg by mouth every 6 to 8 hours as needed for pain or fever  2. Use ice as needed for pain and swelling.  3. Elevate extremity to help with swelling.  4. Follow-up with your primary doctor as needed.  5. You may return to the ED as needed for new or worsening symptoms such as reinjury, severe and uncontrollable pain, focal weakness, severe numbness and tingling, any other concerning symptoms.

## 2023-04-06 NOTE — ED TRIAGE NOTES
[facility]  ED Arrival Note    Arrives through triage. ABC's intact. A &O X4. . Pt arrives with c/o L leg pain behind the knee and on the calf. Denies hx of blood clots or any other significant health history besides HTN. Patient noted to have difficulty walking in triage.       Visitors during triage: Spouse      Triage Interventions: Direct rooming     Ambulatory: Yes    Meets Stroke Criteria?: No    Meets Trauma Criteria?: No    Shock Index: <0.8, for provider reference    Directed to: Main ED    Pronouns: she/her       Triage Assessment     Row Name 04/06/23 0618       Triage Assessment (Adult)    Airway WDL WDL       Respiratory WDL    Respiratory WDL WDL       Skin Circulation/Temperature WDL    Skin Circulation/Temperature WDL WDL       Cardiac WDL    Cardiac WDL WDL       Peripheral/Neurovascular WDL    Peripheral Neurovascular WDL WDL       Cognitive/Neuro/Behavioral WDL    Cognitive/Neuro/Behavioral WDL WDL

## 2023-04-06 NOTE — ED PROVIDER NOTES
"  History     Chief Complaint:  Leg Pain       HPI   Cata Etienne is a 52 year old female with a history of obesity and HTN who presents with left leg pain. The patient states that last night she was out to eat when she started to notice pain in the back of her left knee. She got home and the lower part of her left leg kept getting more stiff. She tried to get in bed to stretch it out but that did not help. She denies any pain or stiffness above the left knee or in the right leg. She states there is not much pain when laying down just when she is walking. She denies any warmth or redness in the leg. She has noted ankle swelling that is new in the past week. She denies chest pain, shortness of breath, or abdominal pain. She denies any alcohol or tobacco use. She denies a personal or family history of blood clots.      Independent Historian:   None - Patient Only    Review of External Notes: see MDM     ROS:  Review of Systems   Respiratory: Negative for shortness of breath.    Cardiovascular: Negative for chest pain.   Gastrointestinal: Negative for abdominal pain.   Musculoskeletal: Positive for myalgias.   Skin: Negative for color change.   All other systems reviewed and are negative.      Allergies:  No Known Allergies     Medications:    citalopram   lisinopril   omeprazole     Past Medical History:    HTN  Obesity  GERD  Depression    Family History:    Mother-Breast cancer, diabetes, obesity  Father-HTN    Social History:  The patient presents to the ED with her fiance.    Physical Exam     Patient Vitals for the past 24 hrs:   BP Temp Temp src Pulse Resp SpO2 Height Weight   04/06/23 0618 -- 98.7  F (37.1  C) Oral -- -- -- -- --   04/06/23 0617 (!) 198/77 -- -- 93 18 95 % 1.651 m (5' 5\") (!) 173.3 kg (382 lb)        Physical Exam  Constitutional: Well developed, nontox appearance  Head: Atraumatic.   Neck:  no stridor  Eyes: no scleral icterus  Cardiovascular: RRR, 2+ L DP pulse  Pulmonary/Chest: nml resp " effort  Ext: Warm, well perfused              LLE: Tenderness along lateral lower leg, no obvious deformities, focal tenderness to fibular head, distal CMS intact with brisk cap refill less than 3 seconds  Neurological: A&O, symmetric facies, moves ext x4  Skin: Skin is warm and dry.   Psychiatric: Behavior is normal. Thought content normal.   Nursing note and vitals reviewed.    Emergency Department Course     Imaging:  XR Tibia and Fibula Left 2 Views   Final Result   IMPRESSION: Tibia and fibula negative. No fracture. Mild degenerative   arthritis left knee.      CAROL VALENTINE MD            SYSTEM ID:  [id number]      US Lower Extremity Venous Duplex Left   Final Result   IMPRESSION: Negative for deep venous thrombosis in the left lower   extremity.      DMITRIY TIRADO MD            SYSTEM ID:  [id number]         Report per radiology    Emergency Department Course & Assessments:    Assessments:  0715 Obtained the patients history and performed initial exam  0858 Rechecked the patient and updated her on findings    Independent Interpretation (X-rays, CTs, rhythm strip):  See MDM    Social Determinants of Health affecting care:   See MDM    Disposition:  The patient was discharged to home.     Impression & Plan      Medical Decision Makin year old female presenting w/ left lower leg pain     Social determinants affecting patient's health include:  No significant social determinants negatively affecting the patient's health     I reviewed medical records from  family practice office visit from 3/29/2023 for an overview of the patient's health history     DDx includes muscle strain, peripheral neuropathy, radiculopathy although less likely, stress fracture.  Doubt acute arterial insufficiency given history and physical exam, no evidence of hypoperfusion.  Imaging sig for no evidence of acute DVT; no evidence of acute osseous abnormality on my independent read of x-rays.  Patient declined pain medication in the  emergency department.  Possible muscle strain particularly given patient's symptoms are worse with walking.  RICE instructions given for home.  At this time I feel the pt is safe for discharge.  Recommendations given regarding follow up with PCP and return to the emergency department as needed for new or worsening symptoms.  Pt counseled on all results, disposition and diagnosis.  They are understanding and agreeable to plan. Patient discharged in stable condition.    Diagnosis:    ICD-10-CM    1. Muscle strain of left lower leg, initial encounter  S86.912A            Scribe Disclosure:  [place], am serving as a scribe at 7:09 AM on 4/6/2023 to document services personally performed by John Farias MD based on my observations and the provider's statements to me.     4/6/2023   John Farias MD Vaughn, Christopher E, MD  04/06/23 1019

## 2023-05-04 NOTE — TELEPHONE ENCOUNTER
Outpatient Medication Detail     Disp Refills Start End REGAN   citalopram (CELEXA) 10 MG tablet 30 tablet 2 4/3/2023  --   Sig - Route: Take 1 tablet (10 mg) by mouth daily - Oral   Sent to pharmacy as: Citalopram Hydrobromide 10 MG Oral Tablet (celeXA)   Class: E-Prescribe   Order: [id number]   E-Prescribing Status: Receipt confirmed by pharmacy (4/3/2023  1:48 PM CDT)     Pharmacy    [facility] [id number] [place] [address]       Pharmacy seeking refill of citalopram 10mg.  Last Rx'd on 4-3-2023 good for 3 months .    Too soon to renew.    Fax sent back to pharmacy - check records for Rx; not due to renew.    Appointments in Next Year    May 30, 2023  7:00 AM  (Arrive by 6:45 AM)  Provider Visit with Kasi Dorsey PA-C  [facility] ([facility] ) [phone]            RT Rogerio (R)

## 2023-05-26 NOTE — PROGRESS NOTES
Assessment & Plan     ROBERT (generalized anxiety disorder)  Continue citalopram 40 mg daily.  Refilled.  Still has number for therapist to call and schedule.  - citalopram (CELEXA) 40 MG tablet  Dispense: 90 tablet; Refill: 2    Mild reactive airways disease, unspecified whether persistent  Continue albuterol inhaler as needed for breathing.  - albuterol (PROAIR HFA/PROVENTIL HFA/VENTOLIN HFA) 108 (90 Base) MCG/ACT inhaler  Dispense: 18 g; Refill: 3    Hypertension, unspecified type  Continue lisinopril 20 mg daily.    Gastroesophageal reflux disease without esophagitis  Continue omeprazole 20 mg daily, add Pepcid 20 mg at night.  Both parents with history of Case's esophagus.  Offered endoscopy which she declines today.    Plan for physical in 3 months.  Follow-up sooner if needed.      30 minutes spent by me on the date of the encounter doing chart review, review of test results, interpretation of tests, patient visit and documentation          Return in about 3 months (around 8/30/2023) for follow-up per plan.    The likelihood of other entities in the differential is insufficient to justify any further testing for them at this time. This was explained to the patient. The patient was advised that persistent or worsening symptoms would require further evaluation. Patient advised to call the office and if unable to reach to go to the emergency room if they develop any new or worsening symptoms. Expressed understanding and agreement with above stated plan.     KELSEY العلي [facility]   Cata Etienne is a 53 year old female presenting for the following health issues:  Patient presents with:  Follow Up    Here today for follow-up.  Overall doing well since increase in citalopram.  Went from 20 mg to 30 mg and then eventually it jumped to 40 mg.  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    Recently returned from a road trip to  "[place].  Lots of walking on the trip which she tolerated well.  Happy she had an inhaler which she used to help with her breathing due to the humidity.    Ongoing issues with GERD.  More or less controlled on 20 mg omeprazole.  Will wake up with a burning sensation in her throat every so often.    Blood pressure well controlled today on 20 mg of lisinopril.    Review of Systems   Constitutional, HEENT, cardiovascular, pulmonary, GI, , musculoskeletal, neuro, skin, endocrine and psych systems are negative, except as otherwise noted.      Objective    /77 (BP Location: Right arm, Patient Position: Sitting, Cuff Size: Adult Large)   Pulse 77   Temp 97.7  F (36.5  C) (Temporal)   Resp 16   Ht 1.651 m (5' 5\")   Wt (!) 173.7 kg (383 lb)   LMP 08/07/2017 (Approximate)   SpO2 97%   BMI 63.73 kg/m    5' 5\"  383 lbs 0 oz    Allergies   Allergen Reactions     No Known Allergies      Current Outpatient Medications   Medication Sig Dispense Refill     albuterol (PROAIR HFA/PROVENTIL HFA/VENTOLIN HFA) 108 (90 Base) MCG/ACT inhaler Inhale 2 puffs into the lungs every 6 hours as needed for shortness of breath, wheezing or cough 18 g 3     albuterol (PROAIR HFA/PROVENTIL HFA/VENTOLIN HFA) 108 (90 Base) MCG/ACT inhaler Inhale 2 puffs into the lungs every 4 hours as needed for shortness of breath / dyspnea or wheezing (cough) 18 g 0     citalopram (CELEXA) 10 MG tablet Take 1 tablet (10 mg) by mouth daily 30 tablet 2     citalopram (CELEXA) 20 MG tablet Take 1 tablet (20 mg) by mouth daily 30 tablet 2     citalopram (CELEXA) 40 MG tablet Take 1 tablet (40 mg) by mouth daily 90 tablet 2     lisinopril (ZESTRIL) 20 MG tablet Take 1 tablet (20 mg) by mouth daily for Blood Pressure 90 tablet 3     omeprazole (PRILOSEC) 20 MG DR capsule Take 1 capsule (20 mg) by mouth daily 90 capsule 3     Past Medical History:   Diagnosis Date     HTN (hypertension)      Obesity      No past surgical history on file.    Physical Exam "   GENERAL: healthy, alert and no distress  EYES: Eyes grossly normal to inspection, PERRL and conjunctivae and sclerae normal  HENT: ear canals and TM's normal, nose and mouth without ulcers or lesions  NECK: no adenopathy, no asymmetry, masses, or scars and thyroid normal to palpation  RESP: lungs clear to auscultation - no rales, rhonchi or wheezes  CV: regular rate and rhythm, normal S1 S2, no S3 or S4, no murmur, click or rub, no peripheral edema and peripheral pulses strong  ABDOMEN: soft, nontender, no hepatosplenomegaly, no masses and bowel sounds normal  MS: no gross musculoskeletal defects noted, no edema  SKIN: no suspicious lesions or rashes  NEURO: Normal strength and tone, mentation intact and speech normal  PSYCH: mentation appears normal, affect normal/bright        Answers for HPI/ROS submitted by the patient on 5/30/2023  If you checked off any problems, how difficult have these problems made it for you to do your work, take care of things at home, or get along with other people?: Not difficult at all  PHQ9 TOTAL SCORE: 2  ROBERT 7 TOTAL SCORE: 5  Do you check your blood pressure regularly outside of the clinic?: No  Are your blood pressures ever more than 140 on the top number (systolic) OR more than 90 on the bottom number (diastolic)? (For example, greater than 140/90): No  Are you following a low salt diet?: No  Depression/Anxiety: Anxiety  Anxiety since last: : better  Other associated symotome: : No  Significant life event: : other  Anxious:: Yes  Current substance use:: No  What is the reason for your visit today? : follow up on med changes  How many servings of fruits and vegetables do you eat daily?: 2-3  On average, how many sweetened beverages do you drink each day (Examples: soda, juice, sweet tea, etc.  Do NOT count diet or artificially sweetened beverages)?: 0  How many minutes a day do you exercise enough to make your heart beat faster?: 20 to 29  How many days a week do you exercise  enough to make your heart beat faster?: 3 or less  How many days per week do you miss taking your medication?: 0

## 2023-05-30 ENCOUNTER — OFFICE VISIT (OUTPATIENT)
Dept: FAMILY MEDICINE | Facility: CLINIC | Age: 53
End: 2023-05-30
Payer: COMMERCIAL

## 2023-05-30 VITALS
DIASTOLIC BLOOD PRESSURE: 77 MMHG | HEIGHT: 65 IN | BODY MASS INDEX: 48.82 KG/M2 | WEIGHT: 293 LBS | TEMPERATURE: 97.7 F | RESPIRATION RATE: 16 BRPM | HEART RATE: 77 BPM | OXYGEN SATURATION: 97 % | SYSTOLIC BLOOD PRESSURE: 124 MMHG

## 2023-05-30 DIAGNOSIS — K21.9 GASTROESOPHAGEAL REFLUX DISEASE WITHOUT ESOPHAGITIS: ICD-10-CM

## 2023-05-30 DIAGNOSIS — F41.1 GAD (GENERALIZED ANXIETY DISORDER): Primary | ICD-10-CM

## 2023-05-30 DIAGNOSIS — J45.909 MILD REACTIVE AIRWAYS DISEASE, UNSPECIFIED WHETHER PERSISTENT: ICD-10-CM

## 2023-05-30 DIAGNOSIS — I10 HYPERTENSION, UNSPECIFIED TYPE: ICD-10-CM

## 2023-05-30 PROCEDURE — 99214 OFFICE O/P EST MOD 30 MIN: CPT | Performed by: PHYSICIAN ASSISTANT

## 2023-05-30 RX ORDER — CITALOPRAM HYDROBROMIDE 40 MG/1
40 TABLET ORAL DAILY
Qty: 90 TABLET | Refills: 2 | Status: SHIPPED | OUTPATIENT
Start: 2023-05-30 | End: 2024-02-27

## 2023-05-30 RX ORDER — ALBUTEROL SULFATE 90 UG/1
2 AEROSOL, METERED RESPIRATORY (INHALATION) EVERY 6 HOURS PRN
Qty: 18 G | Refills: 3 | Status: SHIPPED | OUTPATIENT
Start: 2023-05-30 | End: 2023-09-23

## 2023-05-30 ASSESSMENT — ASTHMA QUESTIONNAIRES
QUESTION_5 LAST FOUR WEEKS HOW WOULD YOU RATE YOUR ASTHMA CONTROL: SOMEWHAT CONTROLLED
QUESTION_1 LAST FOUR WEEKS HOW MUCH OF THE TIME DID YOUR ASTHMA KEEP YOU FROM GETTING AS MUCH DONE AT WORK, SCHOOL OR AT HOME: NONE OF THE TIME
ACT_TOTALSCORE: 18
QUESTION_4 LAST FOUR WEEKS HOW OFTEN HAVE YOU USED YOUR RESCUE INHALER OR NEBULIZER MEDICATION (SUCH AS ALBUTEROL): ONE OR TWO TIMES PER DAY
QUESTION_3 LAST FOUR WEEKS HOW OFTEN DID YOUR ASTHMA SYMPTOMS (WHEEZING, COUGHING, SHORTNESS OF BREATH, CHEST TIGHTNESS OR PAIN) WAKE YOU UP AT NIGHT OR EARLIER THAN USUAL IN THE MORNING: ONCE OR TWICE
ACT_TOTALSCORE: 18
QUESTION_2 LAST FOUR WEEKS HOW OFTEN HAVE YOU HAD SHORTNESS OF BREATH: ONCE OR TWICE A WEEK

## 2023-05-30 ASSESSMENT — ANXIETY QUESTIONNAIRES
8. IF YOU CHECKED OFF ANY PROBLEMS, HOW DIFFICULT HAVE THESE MADE IT FOR YOU TO DO YOUR WORK, TAKE CARE OF THINGS AT HOME, OR GET ALONG WITH OTHER PEOPLE?: NOT DIFFICULT AT ALL
1. FEELING NERVOUS, ANXIOUS, OR ON EDGE: SEVERAL DAYS
IF YOU CHECKED OFF ANY PROBLEMS ON THIS QUESTIONNAIRE, HOW DIFFICULT HAVE THESE PROBLEMS MADE IT FOR YOU TO DO YOUR WORK, TAKE CARE OF THINGS AT HOME, OR GET ALONG WITH OTHER PEOPLE: NOT DIFFICULT AT ALL
2. NOT BEING ABLE TO STOP OR CONTROL WORRYING: SEVERAL DAYS
6. BECOMING EASILY ANNOYED OR IRRITABLE: NOT AT ALL
GAD7 TOTAL SCORE: 5
GAD7 TOTAL SCORE: 5
7. FEELING AFRAID AS IF SOMETHING AWFUL MIGHT HAPPEN: NOT AT ALL
7. FEELING AFRAID AS IF SOMETHING AWFUL MIGHT HAPPEN: NOT AT ALL
5. BEING SO RESTLESS THAT IT IS HARD TO SIT STILL: SEVERAL DAYS
3. WORRYING TOO MUCH ABOUT DIFFERENT THINGS: SEVERAL DAYS
4. TROUBLE RELAXING: SEVERAL DAYS
GAD7 TOTAL SCORE: 5

## 2023-05-30 ASSESSMENT — PAIN SCALES - GENERAL: PAINLEVEL: NO PAIN (0)

## 2023-05-30 ASSESSMENT — PATIENT HEALTH QUESTIONNAIRE - PHQ9
SUM OF ALL RESPONSES TO PHQ QUESTIONS 1-9: 2
SUM OF ALL RESPONSES TO PHQ QUESTIONS 1-9: 2
10. IF YOU CHECKED OFF ANY PROBLEMS, HOW DIFFICULT HAVE THESE PROBLEMS MADE IT FOR YOU TO DO YOUR WORK, TAKE CARE OF THINGS AT HOME, OR GET ALONG WITH OTHER PEOPLE: NOT DIFFICULT AT ALL

## 2023-06-10 ENCOUNTER — HEALTH MAINTENANCE LETTER (OUTPATIENT)
Age: 53
End: 2023-06-10

## 2023-08-22 ENCOUNTER — OFFICE VISIT (OUTPATIENT)
Dept: FAMILY MEDICINE | Facility: CLINIC | Age: 53
End: 2023-08-22
Payer: COMMERCIAL

## 2023-08-22 ENCOUNTER — LAB (OUTPATIENT)
Dept: FAMILY MEDICINE | Facility: CLINIC | Age: 53
End: 2023-08-22

## 2023-08-22 VITALS
HEIGHT: 65 IN | DIASTOLIC BLOOD PRESSURE: 81 MMHG | WEIGHT: 293 LBS | RESPIRATION RATE: 16 BRPM | TEMPERATURE: 98.9 F | BODY MASS INDEX: 48.82 KG/M2 | OXYGEN SATURATION: 93 % | SYSTOLIC BLOOD PRESSURE: 124 MMHG

## 2023-08-22 DIAGNOSIS — F32.1 MODERATE MAJOR DEPRESSION (H): ICD-10-CM

## 2023-08-22 DIAGNOSIS — Z83.3 FAMILY HISTORY OF DIABETES MELLITUS: ICD-10-CM

## 2023-08-22 DIAGNOSIS — Z12.11 SCREEN FOR COLON CANCER: ICD-10-CM

## 2023-08-22 DIAGNOSIS — Z00.00 ANNUAL PHYSICAL EXAM: Primary | ICD-10-CM

## 2023-08-22 DIAGNOSIS — I10 PRIMARY HYPERTENSION: ICD-10-CM

## 2023-08-22 DIAGNOSIS — Z12.4 CERVICAL CANCER SCREENING: ICD-10-CM

## 2023-08-22 DIAGNOSIS — E66.01 MORBID OBESITY (H): ICD-10-CM

## 2023-08-22 DIAGNOSIS — M79.671 RIGHT FOOT PAIN: ICD-10-CM

## 2023-08-22 DIAGNOSIS — Z12.31 VISIT FOR SCREENING MAMMOGRAM: ICD-10-CM

## 2023-08-22 LAB
ALBUMIN SERPL BCG-MCNC: 4 G/DL (ref 3.5–5.2)
ALP SERPL-CCNC: 116 U/L (ref 35–104)
ALT SERPL W P-5'-P-CCNC: 15 U/L (ref 0–50)
ANION GAP SERPL CALCULATED.3IONS-SCNC: 10 MMOL/L (ref 7–15)
AST SERPL W P-5'-P-CCNC: 21 U/L (ref 0–45)
BASOPHILS # BLD AUTO: 0 10E3/UL (ref 0–0.2)
BASOPHILS NFR BLD AUTO: 0 %
BILIRUB SERPL-MCNC: 0.2 MG/DL
BUN SERPL-MCNC: 14.7 MG/DL (ref 6–20)
CALCIUM SERPL-MCNC: 9.5 MG/DL (ref 8.6–10)
CHLORIDE SERPL-SCNC: 104 MMOL/L (ref 98–107)
CHOLEST SERPL-MCNC: 189 MG/DL
CREAT SERPL-MCNC: 0.73 MG/DL (ref 0.51–0.95)
DEPRECATED HCO3 PLAS-SCNC: 28 MMOL/L (ref 22–29)
EOSINOPHIL # BLD AUTO: 0.2 10E3/UL (ref 0–0.7)
EOSINOPHIL NFR BLD AUTO: 2 %
ERYTHROCYTE [DISTWIDTH] IN BLOOD BY AUTOMATED COUNT: 14.7 % (ref 10–15)
GFR SERPL CREATININE-BSD FRML MDRD: >90 ML/MIN/1.73M2
GLUCOSE SERPL-MCNC: 106 MG/DL (ref 70–99)
HBA1C MFR BLD: 6.3 % (ref 0–5.6)
HCT VFR BLD AUTO: 38.8 % (ref 35–47)
HDLC SERPL-MCNC: 50 MG/DL
HGB BLD-MCNC: 11.7 G/DL (ref 11.7–15.7)
IMM GRANULOCYTES # BLD: 0.1 10E3/UL
IMM GRANULOCYTES NFR BLD: 0 %
LDLC SERPL CALC-MCNC: 122 MG/DL
LYMPHOCYTES # BLD AUTO: 1.9 10E3/UL (ref 0.8–5.3)
LYMPHOCYTES NFR BLD AUTO: 16 %
MCH RBC QN AUTO: 26.2 PG (ref 26.5–33)
MCHC RBC AUTO-ENTMCNC: 30.2 G/DL (ref 31.5–36.5)
MCV RBC AUTO: 87 FL (ref 78–100)
MONOCYTES # BLD AUTO: 0.6 10E3/UL (ref 0–1.3)
MONOCYTES NFR BLD AUTO: 5 %
NEUTROPHILS # BLD AUTO: 8.8 10E3/UL (ref 1.6–8.3)
NEUTROPHILS NFR BLD AUTO: 76 %
NONHDLC SERPL-MCNC: 139 MG/DL
PLATELET # BLD AUTO: 392 10E3/UL (ref 150–450)
POTASSIUM SERPL-SCNC: 5.1 MMOL/L (ref 3.4–5.3)
PROT SERPL-MCNC: 7.4 G/DL (ref 6.4–8.3)
RBC # BLD AUTO: 4.46 10E6/UL (ref 3.8–5.2)
SODIUM SERPL-SCNC: 142 MMOL/L (ref 136–145)
TRIGL SERPL-MCNC: 87 MG/DL
TSH SERPL DL<=0.005 MIU/L-ACNC: 1.76 UIU/ML (ref 0.3–4.2)
WBC # BLD AUTO: 11.6 10E3/UL (ref 4–11)

## 2023-08-22 PROCEDURE — 80061 LIPID PANEL: CPT | Performed by: PHYSICIAN ASSISTANT

## 2023-08-22 PROCEDURE — 84443 ASSAY THYROID STIM HORMONE: CPT | Performed by: PHYSICIAN ASSISTANT

## 2023-08-22 PROCEDURE — 85025 COMPLETE CBC W/AUTO DIFF WBC: CPT | Performed by: PHYSICIAN ASSISTANT

## 2023-08-22 PROCEDURE — 99396 PREV VISIT EST AGE 40-64: CPT | Performed by: PHYSICIAN ASSISTANT

## 2023-08-22 PROCEDURE — 83036 HEMOGLOBIN GLYCOSYLATED A1C: CPT | Performed by: PHYSICIAN ASSISTANT

## 2023-08-22 PROCEDURE — 36415 COLL VENOUS BLD VENIPUNCTURE: CPT | Performed by: PHYSICIAN ASSISTANT

## 2023-08-22 PROCEDURE — 80053 COMPREHEN METABOLIC PANEL: CPT | Performed by: PHYSICIAN ASSISTANT

## 2023-08-22 PROCEDURE — 99214 OFFICE O/P EST MOD 30 MIN: CPT | Mod: 25 | Performed by: PHYSICIAN ASSISTANT

## 2023-08-22 ASSESSMENT — ASTHMA QUESTIONNAIRES
QUESTION_5 LAST FOUR WEEKS HOW WOULD YOU RATE YOUR ASTHMA CONTROL: SOMEWHAT CONTROLLED
QUESTION_2 LAST FOUR WEEKS HOW OFTEN HAVE YOU HAD SHORTNESS OF BREATH: ONCE A DAY
ACT_TOTALSCORE: 16
QUESTION_1 LAST FOUR WEEKS HOW MUCH OF THE TIME DID YOUR ASTHMA KEEP YOU FROM GETTING AS MUCH DONE AT WORK, SCHOOL OR AT HOME: SOME OF THE TIME
QUESTION_3 LAST FOUR WEEKS HOW OFTEN DID YOUR ASTHMA SYMPTOMS (WHEEZING, COUGHING, SHORTNESS OF BREATH, CHEST TIGHTNESS OR PAIN) WAKE YOU UP AT NIGHT OR EARLIER THAN USUAL IN THE MORNING: NOT AT ALL
QUESTION_4 LAST FOUR WEEKS HOW OFTEN HAVE YOU USED YOUR RESCUE INHALER OR NEBULIZER MEDICATION (SUCH AS ALBUTEROL): TWO OR THREE TIMES PER WEEK
ACUTE_EXACERBATION_TODAY: NO
ACT_TOTALSCORE: 16

## 2023-08-22 ASSESSMENT — ENCOUNTER SYMPTOMS
HEARTBURN: 1
WEAKNESS: 0
SORE THROAT: 0
MYALGIAS: 0
ARTHRALGIAS: 1
HEADACHES: 1
JOINT SWELLING: 0
COUGH: 0
SHORTNESS OF BREATH: 1
DIARRHEA: 0
CONSTIPATION: 0
PALPITATIONS: 0
PARESTHESIAS: 0
CHILLS: 0
ABDOMINAL PAIN: 0
FEVER: 0
HEMATURIA: 0
NAUSEA: 0
DIZZINESS: 0
EYE PAIN: 0
DYSURIA: 0
BREAST MASS: 0
HEMATOCHEZIA: 0
NERVOUS/ANXIOUS: 1
FREQUENCY: 0

## 2023-08-22 ASSESSMENT — PAIN SCALES - GENERAL: PAINLEVEL: NO PAIN (0)

## 2023-08-22 NOTE — PROGRESS NOTES
SUBJECTIVE:   CC: Cata is an 53 year old who presents for preventive health visit.     Here today for annual physical.    Upcoming MRI of her L4-L5 with her chiropractor.  Concerns today of her right heel/foot pain.  No inciting injury or trauma.  Requesting to see podiatry.    Due for mammogram, colon cancer screening, and Pap smear.    Lisinopril 20 mg, citalopram 40 mg, and omeprazole 20 mg.    Healthy Habits:     Getting at least 3 servings of Calcium per day:  Yes    Bi-annual eye exam:  Yes    Dental care twice a year:  Yes    Sleep apnea or symptoms of sleep apnea:  None    Diet:  Low salt    Frequency of exercise:  1 day/week    Duration of exercise:  30-45 minutes    Taking medications regularly:  Yes    Barriers to taking medications:  None    Medication side effects:  None    Additional concerns today:  No      Social History     Tobacco Use    Smoking status: Never    Smokeless tobacco: Never   Substance Use Topics    Alcohol use: Not Currently     Comment: few drinks per month           Reviewed orders with patient.  Reviewed health maintenance and updated orders accordingly - Yes  Lab work is in process  Labs reviewed in EPIC  BP Readings from Last 3 Encounters:   08/22/23 124/81   05/30/23 124/77   04/06/23 (!) 198/77    Wt Readings from Last 3 Encounters:   08/22/23 (!) 172.4 kg (380 lb)   05/30/23 (!) 173.7 kg (383 lb)   04/06/23 (!) 173.3 kg (382 lb)            Patient Active Problem List   Diagnosis    Obesity    GERD (gastroesophageal reflux disease)    CARDIOVASCULAR SCREENING; LDL GOAL LESS THAN 160    Hypertension    Morbid obesity (H)    Moderate major depression (H)     Past Surgical History:   Procedure Laterality Date    EYE SURGERY  March 2022    Cataracts       Social History     Tobacco Use    Smoking status: Never    Smokeless tobacco: Never   Substance Use Topics    Alcohol use: Not Currently     Comment: few drinks per month     Family History   Problem Relation Age of Onset     Hypertension Mother     Diabetes Mother     Obesity Mother     GERD Mother     Hypertension Father     Obesity Father     Esophageal Cancer Father     Breast Cancer Paternal Grandmother     Cancer Paternal Grandfather         leukemia         Current Outpatient Medications   Medication Sig Dispense Refill    albuterol (PROAIR HFA/PROVENTIL HFA/VENTOLIN HFA) 108 (90 Base) MCG/ACT inhaler Inhale 2 puffs into the lungs every 6 hours as needed for shortness of breath, wheezing or cough 18 g 3    albuterol (PROAIR HFA/PROVENTIL HFA/VENTOLIN HFA) 108 (90 Base) MCG/ACT inhaler Inhale 2 puffs into the lungs every 4 hours as needed for shortness of breath / dyspnea or wheezing (cough) 18 g 0    citalopram (CELEXA) 40 MG tablet Take 1 tablet (40 mg) by mouth daily 90 tablet 2    lisinopril (ZESTRIL) 20 MG tablet Take 1 tablet (20 mg) by mouth daily for Blood Pressure 90 tablet 3    omeprazole (PRILOSEC) 20 MG DR capsule Take 1 capsule (20 mg) by mouth daily 90 capsule 3     Allergies   Allergen Reactions    No Known Allergies      Recent Labs   Lab Test 03/29/23  0907 03/11/22  0828 05/03/21  0853 08/16/19  1618   LDL  --  125* 128*  --    HDL  --  54 55  --    TRIG  --  76 91  --    ALT 18 24 24 21   CR 0.72 0.79 0.75 0.67   GFRESTIMATED >90 89 >90 >90   GFRESTBLACK  --   --  >90 >90   POTASSIUM 5.0 4.3 4.0 4.3   TSH  --  1.81  --   --           FHS-7:       3/11/2022     7:17 AM 8/22/2023     6:51 AM   Breast CA Risk Assessment (FHS-7)   Did any of your first-degree relatives have breast or ovarian cancer? No Unknown   Did any of your relatives have bilateral breast cancer? Unknown Unknown   Did any man in your family have breast cancer? No No   Did any woman in your family have breast and ovarian cancer? Unknown Unknown   Did any woman in your family have breast cancer before age 50 y? Yes Yes   Do you have 2 or more relatives with breast and/or ovarian cancer? Unknown Unknown   Do you have 2 or more relatives with breast  and/or bowel cancer? Unknown Unknown     Pertinent mammograms are reviewed under the imaging tab.    History of abnormal Pap smear: unknown.     Reviewed and updated as needed this visit by clinical staff   Tobacco  Allergies  Meds   Med Hx  Surg Hx  Fam Hx  Soc Hx        Reviewed and updated as needed this visit by Provider   Tobacco   Meds   Med Hx  Surg Hx  Fam Hx         Past Medical History:   Diagnosis Date    Arthritis     HTN (hypertension)     Obesity       Past Surgical History:   Procedure Laterality Date    EYE SURGERY  March 2022    Cataracts     OB History   No obstetric history on file.       Review of Systems   Constitutional:  Negative for chills and fever.   HENT:  Negative for congestion, ear pain, hearing loss and sore throat.    Eyes:  Negative for pain and visual disturbance.   Respiratory:  Positive for shortness of breath. Negative for cough.    Cardiovascular:  Positive for peripheral edema. Negative for chest pain and palpitations.   Gastrointestinal:  Positive for heartburn. Negative for abdominal pain, constipation, diarrhea, hematochezia and nausea.   Breasts:  Negative for tenderness, breast mass and discharge.   Genitourinary:  Positive for urgency. Negative for dysuria, frequency, genital sores, hematuria, pelvic pain, vaginal bleeding and vaginal discharge.   Musculoskeletal:  Positive for arthralgias. Negative for joint swelling and myalgias.   Neurological:  Positive for headaches. Negative for dizziness, weakness and paresthesias.   Psychiatric/Behavioral:  Negative for mood changes. The patient is nervous/anxious.      CONSTITUTIONAL: NEGATIVE for fever, chills, change in weight  INTEGUMENTARU/SKIN: NEGATIVE for worrisome rashes, moles or lesions  EYES: NEGATIVE for vision changes or irritation  ENT: NEGATIVE for ear, mouth and throat problems  RESP: NEGATIVE for significant cough or SOB  BREAST: NEGATIVE for masses, tenderness or discharge  CV: NEGATIVE for chest  "pain, palpitations or peripheral edema  GI: NEGATIVE for nausea, abdominal pain, heartburn, or change in bowel habits  : NEGATIVE for unusual urinary or vaginal symptoms. Periods are regular.  MUSCULOSKELETAL: NEGATIVE for significant arthralgias or myalgia  NEURO: NEGATIVE for weakness, dizziness or paresthesias  PSYCHIATRIC: NEGATIVE for changes in mood or affect     OBJECTIVE:   /81 (BP Location: Right arm, Patient Position: Chair, Cuff Size: Adult Regular)   Temp 98.9  F (37.2  C) (Temporal)   Resp 16   Ht 1.651 m (5' 5\")   Wt (!) 172.4 kg (380 lb)   LMP 08/07/2017 (Approximate)   SpO2 93%   Breastfeeding No   BMI 63.24 kg/m    Physical Exam  GENERAL: healthy, alert and no distress  EYES: Eyes grossly normal to inspection, PERRL and conjunctivae and sclerae normal  HENT: ear canals and TM's normal, nose and mouth without ulcers or lesions  NECK: no adenopathy, no asymmetry, masses, or scars and thyroid normal to palpation  RESP: lungs clear to auscultation - no rales, rhonchi or wheezes  CV: regular rate and rhythm, normal S1 S2, no S3 or S4, no murmur, click or rub, no peripheral edema and peripheral pulses strong  ABDOMEN: soft, nontender, no hepatosplenomegaly, no masses  MS: no gross musculoskeletal defects noted, no edema.  Right foot is atraumatic.  No pain with palpation.  Strong distal pulses.  SKIN: no suspicious lesions or rashes  NEURO: Normal strength and tone, mentation intact and speech normal  PSYCH: mentation appears normal, affect normal/bright    ASSESSMENT/PLAN:   Cata was seen today for physical.    Diagnoses and all orders for this visit:    Annual physical exam    Annual physical labs today.  Continue same medication.  Establish with therapist.  Mammogram order placed.  Decided to pursue Cologuard.  Knows if positive test needs to get colonoscopy.  Pap smear she plans to call and schedule with female provider.    -     CBC with platelets and differential; Future  -     " Comprehensive metabolic panel (BMP + Alb, Alk Phos, ALT, AST, Total. Bili, TP); Future  -     TSH with free T4 reflex; Future  -     Lipid panel reflex to direct LDL Fasting; Future    Primary hypertension    Continue lisinopril 20 mg daily.  Focus on weight loss, active lifestyle    -     CBC with platelets and differential; Future  -     Comprehensive metabolic panel (BMP + Alb, Alk Phos, ALT, AST, Total. Bili, TP); Future  -     TSH with free T4 reflex; Future    Moderate major depression (H)  Continue citalopram 40 mg.  Discussed at last visit she is to establish care with a therapist which she thinks will be helpful.    Morbid obesity (H)  Healthy diet and exercise.    Visit for screening mammogram  -     MA SCREENING DIGITAL BILAT - Future  (s+30); Future    Screen for colon cancer  -     COLOGUARD([employer]); Future    Cervical cancer screening  Plans to make appointment for Pap smear.    Right foot pain  Suspect related to planter fasciitis.  Stretching.  Rolling with tennis ball/frozen water bottle.  Good supportive shoes when walking.    -     Orthopedic  Referral; Future    Family history of diabetes mellitus  -     Hemoglobin A1c    Patient has been advised of split billing requirements and indicates understanding: Yes    COUNSELING:  Reviewed preventive health counseling, as reflected in patient instructions       Regular exercise       Healthy diet/nutrition       Vision screening       Colorectal Cancer Screening    She reports that she has never smoked. She has never used smokeless tobacco.          The likelihood of other entities in the differential is insufficient to justify any further testing for them at this time. This was explained to the patient. The patient was advised that persistent or worsening symptoms would require further evaluation. Patient advised to call the office and if unable to reach to go to the emergency room if they develop any new or worsening symptoms.  Expressed understanding and agreement with above stated plan.     KELSEY العلي [facility]

## 2023-08-23 NOTE — RESULT ENCOUNTER NOTE
Po Ivy,     It was nice seeing you for your annual physical.    - Normal CBC (white blood cells, hemoglobin and platelets)    -  Stable electrolytes, kidney function and liver enzymes     - Normal Thyroid function    -A1c is at 6.3% which consistent with prediabetes.  This does not mean that you have diabetes however are at increased risk for developing this in the future. > 6.5% is technically diabetes. We should follow this up in 3-6 months. Need to focus on limiting carbs and especially desserts/sweet. Stay active.     -Cholesterol is additionally elevated. Based on your ASCVD risk score you are not in need of medication.  Focus on diet as mentioned above.    Cut back on the amount of fat and cholesterol in your meals  Eat more fresh vegetables and fruits  Eat lean proteins such as fish, poultry, beans, and peas  Eat less red meat and processed meats  Use low-fat dairy products  Use vegetable and nut oils in limited amounts  Limit sweets and processed foods like chips, cookies, and baked goods  Limit sugar-sweetened beverages you drink  Limit how often you eat out  Limit alcohol    Let me know if you have any questions or concerns - see you in 3-6 months. Mammogram and cologuard to schedule + PAP.     Let me know if you have any questions or concerns,     KELSEY العلي [facility]

## 2023-09-19 ENCOUNTER — TELEPHONE (OUTPATIENT)
Dept: FAMILY MEDICINE | Facility: CLINIC | Age: 53
End: 2023-09-19
Payer: COMMERCIAL

## 2023-09-19 NOTE — TELEPHONE ENCOUNTER
"TO KELSEY    \"I have some spinal stenosis issues going on from my back pain. I am wondering what it would take to get handicap sticker until I can get over this and loose some weight. I have to go see a specialist at [facility] to see if I can have surgery.\"    Patient states she will have her records from the MRI sent over to you as well.    Please advise.     Lakshmi Starkey RN on 9/19/2023 at 7:43 AM    "

## 2023-09-20 NOTE — TELEPHONE ENCOUNTER
Called Pt and informed her that form is completed.  She will  at the .  Copy sent to [facility] and placed in Pink [employer] accordion file

## 2023-09-23 DIAGNOSIS — J45.909 MILD REACTIVE AIRWAYS DISEASE, UNSPECIFIED WHETHER PERSISTENT: ICD-10-CM

## 2023-09-25 RX ORDER — ALBUTEROL SULFATE 90 UG/1
2 AEROSOL, METERED RESPIRATORY (INHALATION) EVERY 6 HOURS PRN
Qty: 18 G | Refills: 4 | Status: SHIPPED | OUTPATIENT
Start: 2023-09-25 | End: 2024-06-19

## 2023-10-03 ENCOUNTER — TRANSFERRED RECORDS (OUTPATIENT)
Dept: HEALTH INFORMATION MANAGEMENT | Facility: CLINIC | Age: 53
End: 2023-10-03
Payer: COMMERCIAL

## 2023-10-09 ENCOUNTER — TELEPHONE (OUTPATIENT)
Dept: FAMILY MEDICINE | Facility: CLINIC | Age: 53
End: 2023-10-09
Payer: COMMERCIAL

## 2023-10-09 NOTE — TELEPHONE ENCOUNTER
"TO KELSEY    Patient calling stating that \"long term\" box was checked on the disability parking form along with an end date of next September.\"     Patient is wondering if it was supposed to be just until next September or if it was supposed to be longer.     Please advise.     Lakshmi Starkey RN on 10/9/2023 at 7:48 AM        "

## 2023-10-09 NOTE — TELEPHONE ENCOUNTER
She would like form  filled out for longer as it is free if needed for a longer period of time.     Lakshmi Starkey RN on 10/9/2023 at 2:55 PM

## 2023-10-10 NOTE — TELEPHONE ENCOUNTER
I don't think edits allowed handicap parking form.    Couldn't find in pink accordion. Not scanned in.     Link to just print blank or type online then print.     https://[place]ps.[place].gov/divisions/dvs/forms-documents/Documents/MV_[email or web address]    Lo LLAMAS MA

## 2023-10-11 NOTE — TELEPHONE ENCOUNTER
Called and informed Pt form is ready to  at the .  Copy sent to [employer] and placed in Pink pod accordion file

## 2023-11-08 ENCOUNTER — MYC MEDICAL ADVICE (OUTPATIENT)
Dept: FAMILY MEDICINE | Facility: CLINIC | Age: 53
End: 2023-11-08
Payer: COMMERCIAL

## 2023-11-08 NOTE — LETTER
Po Etienne       Please complete the attached questionnaire at your earliest convenience. If you have any questions please don't hesitate to contact the clinic at [phone].       To make this experience go by smooth we recommend that you have a piece of paper and pen nearby. You can document your results on the piece of paper with the corresponding number and reply to this [employer] message with your answers, or bring filled out copy to next appt.      This is valuable information that will be requested by your Care Team.       Health Maintenance Due   Topic Date Due    ASTHMA ACTION PLAN  Never done    ADVANCE CARE PLANNING  Never done    MAMMO SCREENING  Never done    HEPATITIS B IMMUNIZATION (1 of 3 - 3-dose series) Never done    COLORECTAL CANCER SCREENING  Never done    ZOSTER IMMUNIZATION (1 of 2) Never done    PAP  07/01/2018    INFLUENZA VACCINE (1) 09/01/2023    COVID-19 Vaccine (6 - 2023-24 season) 09/01/2023    PHQ-9  11/30/2023          Thanks,      Your care team at [facility]

## 2023-11-08 NOTE — TELEPHONE ENCOUNTER
Patient Quality Outreach    Patient is due for the following:   Cervical Cancer Screening - PAP Needed    Next Steps:   Schedule a office visit for pap exam    Type of outreach:    Called [facility] to give call back to me to request if there was another appt post 2015 exam in office.    Next Steps:  Reach out within 90 days via Phone and pt first reached out  8/24/2023 , and 9/01/2023 second attempt , [facility] message sent prior.    Max number of attempts reached: Yes. Will try again in 90 days if patient still on fail list.    Questions for provider review:    None           Wilbert Lopez MA

## 2023-11-08 NOTE — TELEPHONE ENCOUNTER
Patient Quality Outreach    Patient is due for the following:   Cervical Cancer Screening - PAP Needed  Depression  -  PHQ-9 needed    Next Steps:   Patient was assigned appropriate questionnaire to complete    Type of outreach:    Sent letter. and Chart review performed, no outreach needed.      Questions for provider review:    None           Wilbert Lopez MA

## 2024-02-27 DIAGNOSIS — F41.1 GAD (GENERALIZED ANXIETY DISORDER): ICD-10-CM

## 2024-02-27 RX ORDER — CITALOPRAM HYDROBROMIDE 40 MG/1
40 TABLET ORAL DAILY
Qty: 90 TABLET | Refills: 0 | Status: SHIPPED | OUTPATIENT
Start: 2024-02-27 | End: 2024-06-19

## 2024-04-10 ENCOUNTER — MYC MEDICAL ADVICE (OUTPATIENT)
Dept: FAMILY MEDICINE | Facility: CLINIC | Age: 54
End: 2024-04-10
Payer: COMMERCIAL

## 2024-04-10 NOTE — TELEPHONE ENCOUNTER
Patient Quality Outreach    Patient is due for the following:   Asthma  -  ACT needed  Colon Cancer Screening  Breast Cancer Screening - Mammogram  Cervical Cancer Screening - PAP Needed  Depression  -  PHQ-9 needed      Topic Date Due    Pneumococcal Vaccine (1 of 2 - PCV) Never done    Hepatitis B Vaccine (1 of 3 - 19+ 3-dose series) Never done    Zoster (Shingles) Vaccine (1 of 2) Never done    Flu Vaccine (1) 09/01/2023    COVID-19 Vaccine (6 - 2023-24 season) 09/01/2023       Next Steps:   Schedule a office visit for recommendations  Patient was assigned appropriate questionnaire to complete    Type of outreach:    Sent "[employer] message.      Questions for provider review:    None           Wilbert Lopez, CMA

## 2024-04-18 DIAGNOSIS — I10 BENIGN HYPERTENSION: ICD-10-CM

## 2024-04-18 RX ORDER — LISINOPRIL 20 MG/1
20 TABLET ORAL DAILY
Qty: 90 TABLET | Refills: 0 | Status: SHIPPED | OUTPATIENT
Start: 2024-04-18 | End: 2024-06-19

## 2024-05-22 ENCOUNTER — TELEPHONE (OUTPATIENT)
Dept: FAMILY MEDICINE | Facility: CLINIC | Age: 54
End: 2024-05-22
Payer: COMMERCIAL

## 2024-05-22 NOTE — TELEPHONE ENCOUNTER
Medication Question or Refill    Contacts         Type Contact Phone/Fax    05/22/2024 04:07 PM CDT Phone (Incoming) Molly Etiennei L (Self) [phone] (M)            What medication are you calling about (include dose and sig)?: rasheed    Preferred Pharmacy:   [id number] [place] [facility] [place], [place] - [address]  [address]  [place] [address]  Phone: [phone] Fax: [phone]    [facility] [address] [place], [place] - [address] [facility] [address]  [address] [facility] [address]  [facility] [address]  Phone: [phone] Fax: [phone]    [facility] [address] [place] [facility] [address] [address] [address] [facility]  [address]  [facility] [address]  Phone: [phone] Fax: [phone]    [facility] [address] [place] [facility] [address]  [address] [facility] [address]  Phone: [phone] Fax: [phone]      Controlled Substance Agreement on file:   CSA -- Patient Level:    CSA: None found at the patient level.       Who prescribed the medication?: Dr. Dorsey    Do you need a refill?     When did you use the medication last? 5/22/2024    Patient offered an appointment? No    Do you have any questions or concerns?  Yes: Patient currently in [place] and having increased anxiety and would like advise on if she can take double her medication.        Could we send this information to you in [employer] or would you prefer to receive a phone call?:   Patient would prefer a phone call   Okay to leave a detailed message?: Yes at Cell number on file:    Telephone Information:   Mobile [phone]

## 2024-06-19 ENCOUNTER — VIRTUAL VISIT (OUTPATIENT)
Dept: FAMILY MEDICINE | Facility: CLINIC | Age: 54
End: 2024-06-19
Payer: COMMERCIAL

## 2024-06-19 DIAGNOSIS — F41.1 GAD (GENERALIZED ANXIETY DISORDER): ICD-10-CM

## 2024-06-19 DIAGNOSIS — I10 BENIGN HYPERTENSION: ICD-10-CM

## 2024-06-19 DIAGNOSIS — J45.909 MILD REACTIVE AIRWAYS DISEASE, UNSPECIFIED WHETHER PERSISTENT: ICD-10-CM

## 2024-06-19 DIAGNOSIS — K21.9 GASTROESOPHAGEAL REFLUX DISEASE WITHOUT ESOPHAGITIS: ICD-10-CM

## 2024-06-19 PROCEDURE — 99214 OFFICE O/P EST MOD 30 MIN: CPT | Mod: 95 | Performed by: PHYSICIAN ASSISTANT

## 2024-06-19 RX ORDER — ALBUTEROL SULFATE 90 UG/1
2 AEROSOL, METERED RESPIRATORY (INHALATION) EVERY 6 HOURS PRN
Qty: 18 G | Refills: 4 | Status: SHIPPED | OUTPATIENT
Start: 2024-06-19

## 2024-06-19 RX ORDER — LISINOPRIL 20 MG/1
20 TABLET ORAL DAILY
Qty: 90 TABLET | Refills: 3 | Status: SHIPPED | OUTPATIENT
Start: 2024-06-19

## 2024-06-19 RX ORDER — CITALOPRAM HYDROBROMIDE 40 MG/1
40 TABLET ORAL DAILY
Qty: 90 TABLET | Refills: 3 | Status: SHIPPED | OUTPATIENT
Start: 2024-06-19

## 2024-06-19 NOTE — PROGRESS NOTES
"Cata is a 54 year old who is being evaluated via a billable video visit.    What phone number would you like to be contacted at? [phone]   How would you like to obtain your AVS? MyChart      Assessment & Plan     ROBERT (generalized anxiety disorder)  Continue Celexa 40 mg.  Close follow-up with change given significant recent stressors.  - citalopram (CELEXA) 40 MG tablet  Dispense: 90 tablet; Refill: 3    Benign hypertension  Stable.  At home blood pressure monitoring.  Plan to schedule annual physical in the coming months.  - lisinopril (ZESTRIL) 20 MG tablet  Dispense: 90 tablet; Refill: 3    Gastroesophageal reflux disease without esophagitis  Refilled omeprazole.  Stable.  - omeprazole (PRILOSEC) 20 MG DR capsule  Dispense: 90 capsule; Refill: 3    Mild reactive airways disease, unspecified whether persistent  Albuterol as needed  - albuterol (PROAIR HFA/PROVENTIL HFA/VENTOLIN HFA) 108 (90 Base) MCG/ACT inhaler  Dispense: 18 g; Refill: 4    BMI  Estimated body mass index is 63.24 kg/m  as calculated from the following:    Height as of 8/22/23: 1.651 m (5' 5\").    Weight as of 8/22/23: 172.4 kg (380 lb).   Weight management plan: Discussed healthy diet and exercise guidelines      Subjective   Cata is a 54 year old, presenting for the following health issues:  Refill Request and Recheck Medication    Visit today for medication refills. Due for annual physical. Unable to schedule till Sept.   Unfortunately, it has been a challenging few months. Lost her older brother and now her mom is transitioning to hospice care.    Review of Systems  Constitutional, neuro, ENT, endocrine, pulmonary, cardiac, gastrointestinal, genitourinary, musculoskeletal, integument and psychiatric systems are negative, except as otherwise noted.      Objective    Vitals - Patient Reported  Weight (Patient Reported): 172.4 kg (380 lb)  Height (Patient Reported): 165.1 cm (5' 5\")  BMI (Based on Pt Reported Ht/Wt): 63.23  Pain Score: No " Pain (0)      Vitals:  No vitals were obtained today due to virtual visit.    Physical Exam   GENERAL: alert and no distress  EYES: Eyes grossly normal to inspection.  No discharge or erythema, or obvious scleral/conjunctival abnormalities.  RESP: No audible wheeze, cough, or visible cyanosis.    SKIN: Visible skin clear. No significant rash, abnormal pigmentation or lesions.  NEURO: Cranial nerves grossly intact.  Mentation and speech appropriate for age.  PSYCH: Appropriate affect, tone, and pace of words        Video-Visit Details    Type of service:  Video Visit   Originating Location (pt. Location): Home    Distant Location (provider location):  On-site  Platform used for Video Visit: Doximity    The likelihood of other entities in the differential is insufficient to justify any further testing for them at this time. This was explained to the patient. The patient was advised that persistent or worsening symptoms would require further evaluation. Patient advised to call the office and if unable to reach to go to the emergency room if they develop any new or worsening symptoms. Expressed understanding and agreement with above stated plan.       Signed Electronically by: Kasi Dorsey PA-C

## 2024-06-29 ENCOUNTER — OFFICE VISIT (OUTPATIENT)
Dept: URGENT CARE | Facility: URGENT CARE | Age: 54
End: 2024-06-29
Payer: COMMERCIAL

## 2024-06-29 VITALS
WEIGHT: 293 LBS | HEART RATE: 84 BPM | DIASTOLIC BLOOD PRESSURE: 79 MMHG | BODY MASS INDEX: 63.7 KG/M2 | TEMPERATURE: 97.8 F | SYSTOLIC BLOOD PRESSURE: 147 MMHG | OXYGEN SATURATION: 96 %

## 2024-06-29 DIAGNOSIS — K04.7 DENTAL INFECTION: Primary | ICD-10-CM

## 2024-06-29 PROCEDURE — 99213 OFFICE O/P EST LOW 20 MIN: CPT | Performed by: FAMILY MEDICINE

## 2024-06-29 RX ORDER — AMOXICILLIN 500 MG/1
500 CAPSULE ORAL 3 TIMES DAILY
Qty: 30 CAPSULE | Refills: 0 | Status: SHIPPED | OUTPATIENT
Start: 2024-06-29 | End: 2024-07-09

## 2024-06-29 NOTE — PROGRESS NOTES
Assessment & Plan     Dental infection    - amoxicillin (AMOXIL) 500 MG capsule; Take 1 capsule (500 mg) by mouth 3 times daily for 10 days    Start amoxicillin tid x 10 days.  Keep appt on Monday with dentist.  Continue with NSAIDs/Tylenol prn comfort.  Ice PRN comfort.  Close Follow-up if no change or new or worsening sx prn.    Lima Cintron MD  [facility]    Subjective   Cata is a 54 year old, presenting for the following health issues:  Urgent Care (Tooth infection, throbbing, right bottom tooth. Dentist appt. Monday. Swollen at jaw line.)    HPI     Presents with worsening pain and swelling of RIGHT lower cheek with known cracked tooth.  Started with migraine last night.  Noted swelling today.  Tylenol/ibuprofen not helping.  No fever.            Review of Systems  Constitutional, neuro, ENT, endocrine, pulmonary, cardiac, gastrointestinal, genitourinary, musculoskeletal, integument and psychiatric systems are negative, except as otherwise noted.      Objective    BP (!) 147/79   Pulse 84   Temp 97.8  F (36.6  C) (Tympanic)   Wt (!) 173.6 kg (382 lb 12.8 oz)   LMP 08/07/2017 (Approximate)   SpO2 96%   BMI 63.70 kg/m    Body mass index is 63.7 kg/m .  Physical Exam   GENERAL: alert and no distress  EYES: Eyes grossly normal to inspection, PERRL and conjunctivae and sclerae normal  HENT: RIGHT lower tooth cracked with pain on tapping.  Increased swelling of RIGHT lower cheek with broken tooth underlying affected area.  No redness or streaking.  PSYCH: mentation appears normal, affect normal/bright            Signed Electronically by: Lashay Son MD

## 2024-08-14 ENCOUNTER — MYC MEDICAL ADVICE (OUTPATIENT)
Dept: FAMILY MEDICINE | Facility: CLINIC | Age: 54
End: 2024-08-14
Payer: COMMERCIAL

## 2024-08-14 NOTE — LETTER
September 20, 2024          Cata Etienne  [address]   [place] [place] [address]        Dear Cata,        We care about your health and have reviewed your health plan and are making recommendations based on this review, to optimize your health.    You are in particular need of attention regarding:  -Blood Pressure (your goal is <140/90) BP Readings from Last 2 Encounters:   06/29/24 (!) 147/79   08/22/23 124/81        Please inform us with an updated blood pressure reading from home by contacting your care team via call or dropping off a written record of Blood pressure readings.    If your blood pressure numbers are high we may require you to have an in office nurse only appointment to check your blood pressure readings.     We are recommending that you:  -schedule a free NURSE-ONLY BLOOD PRESSURE APPOINTMENT within the next 1-2 weeks.    If you would like us to assist with an in office nurse only appointment please respond that you would like an appointment or call [phone].      Thanks,       Your care team at [facility]

## 2024-08-14 NOTE — TELEPHONE ENCOUNTER
Patient Quality Outreach    Patient is due for the following:   Hypertension -  BP check    Next Steps:   Schedule a nurse only visit for Bp check    Type of outreach:    Sent [employer] message.      Questions for provider review:    None           Julianne Bailey CMA

## 2024-09-20 NOTE — TELEPHONE ENCOUNTER
Patient Quality Outreach    Patient is due for the following:   Hypertension -  BP check    Next Steps:   Schedule a nurse only visit for BP Check    Type of outreach:    Sent [employer] message.      Questions for provider review:    None           Julianne Bailey CMA

## 2024-09-20 NOTE — TELEPHONE ENCOUNTER
Patient Quality Outreach    Patient is due for the following:   Hypertension -  BP check    Next Steps:   Schedule a nurse only visit for BP Check    Type of outreach:    Sent letter.      Questions for provider review:    None           Julianne Bailey CMA

## 2024-10-12 ENCOUNTER — HEALTH MAINTENANCE LETTER (OUTPATIENT)
Age: 54
End: 2024-10-12

## 2025-06-10 DIAGNOSIS — I10 BENIGN HYPERTENSION: ICD-10-CM

## 2025-06-10 DIAGNOSIS — J45.909 MILD REACTIVE AIRWAYS DISEASE, UNSPECIFIED WHETHER PERSISTENT: ICD-10-CM

## 2025-06-10 DIAGNOSIS — F41.1 GAD (GENERALIZED ANXIETY DISORDER): ICD-10-CM

## 2025-06-10 DIAGNOSIS — K21.9 GASTROESOPHAGEAL REFLUX DISEASE WITHOUT ESOPHAGITIS: ICD-10-CM

## 2025-06-10 RX ORDER — OMEPRAZOLE 20 MG/1
20 CAPSULE, DELAYED RELEASE ORAL DAILY
Qty: 90 CAPSULE | Refills: 0 | Status: SHIPPED | OUTPATIENT
Start: 2025-06-10

## 2025-06-10 RX ORDER — CITALOPRAM HYDROBROMIDE 40 MG/1
40 TABLET ORAL DAILY
Qty: 90 TABLET | Refills: 3 | Status: SHIPPED | OUTPATIENT
Start: 2025-06-10

## 2025-06-10 RX ORDER — ALBUTEROL SULFATE 90 UG/1
2 INHALANT RESPIRATORY (INHALATION) EVERY 6 HOURS PRN
Qty: 18 G | Refills: 1 | Status: SHIPPED | OUTPATIENT
Start: 2025-06-10

## 2025-06-10 RX ORDER — LISINOPRIL 20 MG/1
20 TABLET ORAL DAILY
Qty: 90 TABLET | Refills: 3 | Status: SHIPPED | OUTPATIENT
Start: 2025-06-10

## 2025-06-10 NOTE — TELEPHONE ENCOUNTER
Patient contacted clinic requesting refills of prescription medications. Writer informed patient that she would need to schedule an office visit. Patient expressed understanding and agreed to recommendation.

## 2025-06-10 NOTE — TELEPHONE ENCOUNTER
Medication Question or Refill    Contacts       Contact Date/Time Type Contact Phone/Fax    06/10/2025 03:33 PM CDT Phone (Incoming) Molly Etiennei L (Self) [phone] (M)            What medication are you calling about (include dose and sig)?: citalopram (CELEXA) 40 MG tablet [F41.1]   lisinopril (ZESTRIL) 20 MG tablet  [I10]   omeprazole (PRILOSEC) 20 MG DR capsule [K21.9]   albuterol (PROAIR HFA/PROVENTIL HFA/VENTOLIN HFA) 108 (90 Base) MCG/ACT inhaler [J45.909]   Preferred Pharmacy:    [id number] [place], [place] - [address]  [id number]  [place] [address]  Phone: [phone] Fax: [phone]      Controlled Substance Agreement on file:   CSA -- Patient Level:    CSA: None found at the patient level.       Who prescribed the medication?:     Kasi Dorsey PA-C       Do you need a refill? Yes    When did you use the medication last? 6/10/25    Patient offered an appointment? No    Do you have any questions or concerns?  Yes: About an inhaler. Patient currently lost her job and doesn't have insurance due to how expensive it is. Currently getting a new job and to be on new insurance soon. Had a question about billing if she needs to have a VV or a telephone visit.      Could we send this information to you in [place] or would you prefer to receive a phone call?:   Patient would prefer a phone call   Okay to leave a detailed message?: Yes at Home number on file [phone] (home)

## 2025-06-14 ENCOUNTER — HEALTH MAINTENANCE LETTER (OUTPATIENT)
Age: 55
End: 2025-06-14